# Patient Record
Sex: MALE | Race: WHITE | NOT HISPANIC OR LATINO | URBAN - METROPOLITAN AREA
[De-identification: names, ages, dates, MRNs, and addresses within clinical notes are randomized per-mention and may not be internally consistent; named-entity substitution may affect disease eponyms.]

---

## 2018-08-12 ENCOUNTER — INPATIENT (INPATIENT)
Facility: HOSPITAL | Age: 28
LOS: 9 days | Discharge: HOME | DRG: 853 | End: 2018-08-22
Attending: SURGERY | Admitting: SURGERY
Payer: MEDICAID

## 2018-08-12 VITALS
DIASTOLIC BLOOD PRESSURE: 45 MMHG | SYSTOLIC BLOOD PRESSURE: 89 MMHG | OXYGEN SATURATION: 96 % | HEART RATE: 113 BPM | RESPIRATION RATE: 18 BRPM | TEMPERATURE: 101 F | WEIGHT: 139.99 LBS

## 2018-08-12 LAB
ALBUMIN SERPL ELPH-MCNC: 2.2 G/DL — LOW (ref 3.4–5)
ALP SERPL-CCNC: 88 U/L — SIGNIFICANT CHANGE UP (ref 40–120)
ALT FLD-CCNC: 45 U/L — HIGH (ref 12–42)
ANION GAP SERPL CALC-SCNC: 6 MMOL/L — LOW (ref 9–16)
AST SERPL-CCNC: 50 U/L — HIGH (ref 15–37)
BASOPHILS NFR BLD AUTO: 0.5 % — SIGNIFICANT CHANGE UP (ref 0–2)
BILIRUB SERPL-MCNC: 0.4 MG/DL — SIGNIFICANT CHANGE UP (ref 0.2–1.2)
BUN SERPL-MCNC: 21 MG/DL — SIGNIFICANT CHANGE UP (ref 7–23)
CALCIUM SERPL-MCNC: 8.7 MG/DL — SIGNIFICANT CHANGE UP (ref 8.5–10.5)
CHLORIDE SERPL-SCNC: 96 MMOL/L — SIGNIFICANT CHANGE UP (ref 96–108)
CK SERPL-CCNC: 730 U/L — HIGH (ref 39–308)
CO2 SERPL-SCNC: 29 MMOL/L — SIGNIFICANT CHANGE UP (ref 22–31)
CREAT SERPL-MCNC: 1.23 MG/DL — SIGNIFICANT CHANGE UP (ref 0.5–1.3)
EOSINOPHIL NFR BLD AUTO: 0 % — SIGNIFICANT CHANGE UP (ref 0–6)
GLUCOSE SERPL-MCNC: 134 MG/DL — HIGH (ref 70–99)
HCT VFR BLD CALC: 34.1 % — LOW (ref 39–50)
HGB BLD-MCNC: 11.5 G/DL — LOW (ref 13–17)
IMM GRANULOCYTES NFR BLD AUTO: 2.4 % — HIGH (ref 0–1.5)
LACTATE SERPL-SCNC: 3.6 MMOL/L — HIGH (ref 0.4–2)
LYMPHOCYTES # BLD AUTO: 3.7 % — LOW (ref 13–44)
MAGNESIUM SERPL-MCNC: 2.3 MG/DL — SIGNIFICANT CHANGE UP (ref 1.6–2.6)
MCHC RBC-ENTMCNC: 31.3 PG — SIGNIFICANT CHANGE UP (ref 27–34)
MCHC RBC-ENTMCNC: 33.7 G/DL — SIGNIFICANT CHANGE UP (ref 32–36)
MCV RBC AUTO: 92.9 FL — SIGNIFICANT CHANGE UP (ref 80–100)
MONOCYTES NFR BLD AUTO: 6.4 % — SIGNIFICANT CHANGE UP (ref 2–14)
NEUTROPHILS NFR BLD AUTO: 87 % — HIGH (ref 43–77)
PHOSPHATE SERPL-MCNC: 2.5 MG/DL — SIGNIFICANT CHANGE UP (ref 2.5–4.5)
PLATELET # BLD AUTO: 269 K/UL — SIGNIFICANT CHANGE UP (ref 150–400)
POTASSIUM SERPL-MCNC: 4.4 MMOL/L — SIGNIFICANT CHANGE UP (ref 3.5–5.3)
POTASSIUM SERPL-SCNC: 4.4 MMOL/L — SIGNIFICANT CHANGE UP (ref 3.5–5.3)
PROT SERPL-MCNC: 7.2 G/DL — SIGNIFICANT CHANGE UP (ref 6.4–8.2)
RBC # BLD: 3.67 M/UL — LOW (ref 4.2–5.8)
RBC # FLD: 13.2 % — SIGNIFICANT CHANGE UP (ref 10.3–16.9)
SODIUM SERPL-SCNC: 131 MMOL/L — LOW (ref 132–145)
WBC # BLD: 23.9 K/UL — HIGH (ref 3.8–10.5)
WBC # FLD AUTO: 23.9 K/UL — HIGH (ref 3.8–10.5)

## 2018-08-12 PROCEDURE — 99291 CRITICAL CARE FIRST HOUR: CPT

## 2018-08-12 PROCEDURE — 73701 CT LOWER EXTREMITY W/DYE: CPT | Mod: 26,RT

## 2018-08-12 RX ORDER — SODIUM CHLORIDE 9 MG/ML
2000 INJECTION INTRAMUSCULAR; INTRAVENOUS; SUBCUTANEOUS ONCE
Qty: 0 | Refills: 0 | Status: COMPLETED | OUTPATIENT
Start: 2018-08-12 | End: 2018-08-12

## 2018-08-12 RX ORDER — PERMETHRIN CREAM 5% W/W 50 MG/G
1 CREAM TOPICAL ONCE
Qty: 0 | Refills: 0 | Status: COMPLETED | OUTPATIENT
Start: 2018-08-12 | End: 2018-08-12

## 2018-08-12 RX ORDER — AZTREONAM 2 G
1000 VIAL (EA) INJECTION ONCE
Qty: 0 | Refills: 0 | Status: COMPLETED | OUTPATIENT
Start: 2018-08-12 | End: 2018-08-12

## 2018-08-12 RX ORDER — VANCOMYCIN HCL 1 G
1000 VIAL (EA) INTRAVENOUS ONCE
Qty: 0 | Refills: 0 | Status: COMPLETED | OUTPATIENT
Start: 2018-08-12 | End: 2018-08-12

## 2018-08-12 RX ADMIN — PERMETHRIN CREAM 5% W/W 1 APPLICATION(S): 50 CREAM TOPICAL at 23:00

## 2018-08-12 RX ADMIN — SODIUM CHLORIDE 1000 MILLILITER(S): 9 INJECTION INTRAMUSCULAR; INTRAVENOUS; SUBCUTANEOUS at 20:04

## 2018-08-12 RX ADMIN — Medication 1000 MILLIGRAM(S): at 23:00

## 2018-08-12 RX ADMIN — SODIUM CHLORIDE 2000 MILLILITER(S): 9 INJECTION INTRAMUSCULAR; INTRAVENOUS; SUBCUTANEOUS at 23:00

## 2018-08-12 RX ADMIN — Medication 50 MILLIGRAM(S): at 20:04

## 2018-08-12 RX ADMIN — Medication 250 MILLIGRAM(S): at 20:10

## 2018-08-12 NOTE — ED PROVIDER NOTE - MEDICAL DECISION MAKING DETAILS
sepsis vitals, ivdu, likely cellulitis based on clinical exam, will check labs, abx, CT eval for abscess, no lyudmila's on exam,

## 2018-08-12 NOTE — ED PROVIDER NOTE - PHYSICAL EXAMINATION
CON: ao x 3, poor hygiene, HENMT: clear oropharynx, soft neck, HEAD: atraumatic, CV: tachycardic, RESP: cta b/l, GI: +BS, soft, nontender, no rebound, no guarding, SKIN: old needle tracks noted, erythema noted to right anteromedial upper leg, no fluctuance, tender, warm, no bullae, no scrotal involvement, MSK: soft compartment, NEURO: no gross motor or sensory deficit CON: ao x 3, poor hygiene, HENMT: clear oropharynx, soft neck, HEAD: atraumatic, CV: tachycardic, RESP: cta b/l, GI: +BS, soft, nontender, no rebound, no guarding, SKIN: old needle tracks noted to extremities, erythema noted to right anteromedial upper leg, no fluctuance, tender, warm, no bullae, no scrotal involvement, MSK: soft compartment, tender, distal pulses intact, no crepitus noted on exam, pain w/ ROM, NEURO: dec ROM of RLE 2/2 pain,

## 2018-08-12 NOTE — ED ADULT NURSE REASSESSMENT NOTE - NS ED NURSE REASSESS COMMENT FT1
Patient remains in RM 6, no acute distress. Remains on continuous cardiac monitoring. bp improving with IVFs. Patient awake alert and following commands.

## 2018-08-12 NOTE — ED PROVIDER NOTE - PROGRESS NOTE DETAILS
verbal readback from rads, abscess w/ possible nec fasc, no fb, vasc surg consulted immediately via transfer center verbal readback from rads, abscess w/ possible nec fasc, no fb, vasc surg consulted immediately via transfer center    per central transfer center, [facility] center call is not immediately available, on line for other emergency, will call back, understands tier 1 nature of transfer. called [facility] transfer center again, aware of case, still in process of another transfer  called again, spoke w/ vascular team, accepted transfer to SICU pt mentating, bp systolic >95, MAP > 65, 3rd L NS bolus running, will rpt vs, can start 4th bolus w/ LR and consider peripheral levo if bp worsening

## 2018-08-12 NOTE — ED ADULT NURSE NOTE - OBJECTIVE STATEMENT
Patient is an IVDU Patient is an IVDU presents to ED for infection to arms and legs, patient hypotensive and tachycardiac on assessment. Right leg pain with swelling and redness. Patient also has bug infestion in hair.  Sepsis and decontamination code initiated

## 2018-08-12 NOTE — ED ADULT NURSE NOTE - NSIMPLEMENTINTERV_GEN_ALL_ED
Implemented All Fall with Harm Risk Interventions:  Ava to call system. Call bell, personal items and telephone within reach. Instruct patient to call for assistance. Room bathroom lighting operational. Non-slip footwear when patient is off stretcher. Physically safe environment: no spills, clutter or unnecessary equipment. Stretcher in lowest position, wheels locked, appropriate side rails in place. Provide visual cue, wrist band, yellow gown, etc. Monitor gait and stability. Monitor for mental status changes and reorient to person, place, and time. Review medications for side effects contributing to fall risk. Reinforce activity limits and safety measures with patient and family. Provide visual clues: red socks.

## 2018-08-12 NOTE — ED PROVIDER NOTE - OBJECTIVE STATEMENT
28 yom pw fever, noted to be hypotensive, IVDU, right thigh which is a site of injection has pain and swelling, 28 yom pw fever, noted to be hypotensive, IVDU, right thigh which is a site of injection has pain and swelling. 28 yom pw fever, noted to be hypotensive, IVDU, right thigh which is a site of injection has pain and swelling.    unknown allergies to pcn

## 2018-08-12 NOTE — ED ADULT TRIAGE NOTE - CHIEF COMPLAINT QUOTE
here for "infection to arms and inner thigh" admits to IVDU- Pt is hypotensive, tachycardic and febrile in triage- sepsis initiated

## 2018-08-13 LAB
ALBUMIN SERPL ELPH-MCNC: 1.9 G/DL — LOW (ref 3.3–5)
ALP SERPL-CCNC: 73 U/L — SIGNIFICANT CHANGE UP (ref 40–120)
ALT FLD-CCNC: 33 U/L — SIGNIFICANT CHANGE UP (ref 10–45)
ANION GAP SERPL CALC-SCNC: 12 MMOL/L — SIGNIFICANT CHANGE UP (ref 5–17)
ANION GAP SERPL CALC-SCNC: 13 MMOL/L — SIGNIFICANT CHANGE UP (ref 5–17)
APTT BLD: 49.7 SEC — HIGH (ref 27.5–37.4)
AST SERPL-CCNC: 42 U/L — HIGH (ref 10–40)
BASE EXCESS BLDA CALC-SCNC: -1.6 MMOL/L — SIGNIFICANT CHANGE UP (ref -2–3)
BASE EXCESS BLDA CALC-SCNC: -2.3 MMOL/L — LOW (ref -2–3)
BILIRUB DIRECT SERPL-MCNC: <0.2 MG/DL — SIGNIFICANT CHANGE UP (ref 0–0.2)
BILIRUB INDIRECT FLD-MCNC: >0.1 MG/DL — LOW (ref 0.2–1)
BILIRUB SERPL-MCNC: 0.3 MG/DL — SIGNIFICANT CHANGE UP (ref 0.2–1.2)
BUN SERPL-MCNC: 11 MG/DL — SIGNIFICANT CHANGE UP (ref 7–23)
BUN SERPL-MCNC: 14 MG/DL — SIGNIFICANT CHANGE UP (ref 7–23)
CA-I BLDA-SCNC: 0.97 MMOL/L — LOW (ref 1.12–1.3)
CALCIUM SERPL-MCNC: 7 MG/DL — LOW (ref 8.4–10.5)
CALCIUM SERPL-MCNC: 7.3 MG/DL — LOW (ref 8.4–10.5)
CHLORIDE SERPL-SCNC: 102 MMOL/L — SIGNIFICANT CHANGE UP (ref 96–108)
CHLORIDE SERPL-SCNC: 103 MMOL/L — SIGNIFICANT CHANGE UP (ref 96–108)
CK MB CFR SERPL CALC: 4.2 NG/ML — SIGNIFICANT CHANGE UP (ref 0–6.7)
CK SERPL-CCNC: 603 U/L — HIGH (ref 30–200)
CO2 SERPL-SCNC: 21 MMOL/L — LOW (ref 22–31)
CO2 SERPL-SCNC: 23 MMOL/L — SIGNIFICANT CHANGE UP (ref 22–31)
COHGB MFR BLDA: 0.4 % — SIGNIFICANT CHANGE UP
CREAT SERPL-MCNC: 0.62 MG/DL — SIGNIFICANT CHANGE UP (ref 0.5–1.3)
CREAT SERPL-MCNC: 0.65 MG/DL — SIGNIFICANT CHANGE UP (ref 0.5–1.3)
EXTRA RED TOP TUBE: SIGNIFICANT CHANGE UP
GAS PNL BLDA: SIGNIFICANT CHANGE UP
GAS PNL BLDA: SIGNIFICANT CHANGE UP
GLUCOSE BLDC GLUCOMTR-MCNC: 109 MG/DL — HIGH (ref 70–99)
GLUCOSE BLDC GLUCOMTR-MCNC: 120 MG/DL — HIGH (ref 70–99)
GLUCOSE BLDC GLUCOMTR-MCNC: 125 MG/DL — HIGH (ref 70–99)
GLUCOSE BLDC GLUCOMTR-MCNC: 140 MG/DL — HIGH (ref 70–99)
GLUCOSE SERPL-MCNC: 116 MG/DL — HIGH (ref 70–99)
GLUCOSE SERPL-MCNC: 139 MG/DL — HIGH (ref 70–99)
GRAM STN FLD: SIGNIFICANT CHANGE UP
HAV IGM SER-ACNC: SIGNIFICANT CHANGE UP
HBV CORE IGM SER-ACNC: SIGNIFICANT CHANGE UP
HBV SURFACE AG SER-ACNC: SIGNIFICANT CHANGE UP
HCO3 BLDA-SCNC: 22 MMOL/L — SIGNIFICANT CHANGE UP (ref 21–28)
HCO3 BLDA-SCNC: 23 MMOL/L — SIGNIFICANT CHANGE UP (ref 21–28)
HCT VFR BLD CALC: 30.7 % — LOW (ref 39–50)
HCT VFR BLD CALC: 31.3 % — LOW (ref 39–50)
HCT VFR BLD CALC: 32.1 % — LOW (ref 39–50)
HCV AB S/CO SERPL IA: 17.68 S/CO — SIGNIFICANT CHANGE UP
HCV AB SERPL-IMP: REACTIVE
HGB BLD-MCNC: 10 G/DL — LOW (ref 13–17)
HGB BLD-MCNC: 10.3 G/DL — LOW (ref 13–17)
HGB BLD-MCNC: 10.6 G/DL — LOW (ref 13–17)
HGB BLDA-MCNC: 10.3 G/DL — LOW (ref 13–17)
HIV 1+2 AB+HIV1 P24 AG SERPL QL IA: SIGNIFICANT CHANGE UP
INR BLD: 1.2 — HIGH (ref 0.88–1.16)
LACTATE SERPL-SCNC: 1.7 MMOL/L — SIGNIFICANT CHANGE UP (ref 0.5–2)
LACTATE SERPL-SCNC: 2 MMOL/L — SIGNIFICANT CHANGE UP (ref 0.4–2)
LIDOCAIN IGE QN: 17 U/L — SIGNIFICANT CHANGE UP (ref 7–60)
MAGNESIUM SERPL-MCNC: 1.7 MG/DL — SIGNIFICANT CHANGE UP (ref 1.6–2.6)
MAGNESIUM SERPL-MCNC: 1.7 MG/DL — SIGNIFICANT CHANGE UP (ref 1.6–2.6)
MCHC RBC-ENTMCNC: 30.6 PG — SIGNIFICANT CHANGE UP (ref 27–34)
MCHC RBC-ENTMCNC: 30.7 PG — SIGNIFICANT CHANGE UP (ref 27–34)
MCHC RBC-ENTMCNC: 30.8 PG — SIGNIFICANT CHANGE UP (ref 27–34)
MCHC RBC-ENTMCNC: 32.6 G/DL — SIGNIFICANT CHANGE UP (ref 32–36)
MCHC RBC-ENTMCNC: 32.9 G/DL — SIGNIFICANT CHANGE UP (ref 32–36)
MCHC RBC-ENTMCNC: 33 G/DL — SIGNIFICANT CHANGE UP (ref 32–36)
MCV RBC AUTO: 93 FL — SIGNIFICANT CHANGE UP (ref 80–100)
MCV RBC AUTO: 93.7 FL — SIGNIFICANT CHANGE UP (ref 80–100)
MCV RBC AUTO: 93.9 FL — SIGNIFICANT CHANGE UP (ref 80–100)
METHGB MFR BLDA: 0.3 % — SIGNIFICANT CHANGE UP
O2 CT VFR BLDA CALC: SIGNIFICANT CHANGE UP (ref 15–23)
OXYHGB MFR BLDA: 96 % — SIGNIFICANT CHANGE UP (ref 94–100)
PCO2 BLDA: 34 MMHG — LOW (ref 35–48)
PCO2 BLDA: 41 MMHG — SIGNIFICANT CHANGE UP (ref 35–48)
PCP SPEC-MCNC: SIGNIFICANT CHANGE UP
PH BLDA: 7.38 — SIGNIFICANT CHANGE UP (ref 7.35–7.45)
PH BLDA: 7.42 — SIGNIFICANT CHANGE UP (ref 7.35–7.45)
PHOSPHATE SERPL-MCNC: 2.2 MG/DL — LOW (ref 2.5–4.5)
PHOSPHATE SERPL-MCNC: 3.1 MG/DL — SIGNIFICANT CHANGE UP (ref 2.5–4.5)
PLATELET # BLD AUTO: 225 K/UL — SIGNIFICANT CHANGE UP (ref 150–400)
PLATELET # BLD AUTO: 236 K/UL — SIGNIFICANT CHANGE UP (ref 150–400)
PLATELET # BLD AUTO: 312 K/UL — SIGNIFICANT CHANGE UP (ref 150–400)
PO2 BLDA: 138 MMHG — HIGH (ref 83–108)
PO2 BLDA: 92 MMHG — SIGNIFICANT CHANGE UP (ref 83–108)
POTASSIUM BLDA-SCNC: 3.4 MMOL/L — LOW (ref 3.5–4.9)
POTASSIUM SERPL-MCNC: 3.6 MMOL/L — SIGNIFICANT CHANGE UP (ref 3.5–5.3)
POTASSIUM SERPL-MCNC: 3.9 MMOL/L — SIGNIFICANT CHANGE UP (ref 3.5–5.3)
POTASSIUM SERPL-SCNC: 3.6 MMOL/L — SIGNIFICANT CHANGE UP (ref 3.5–5.3)
POTASSIUM SERPL-SCNC: 3.9 MMOL/L — SIGNIFICANT CHANGE UP (ref 3.5–5.3)
PROT SERPL-MCNC: 4.7 G/DL — LOW (ref 6–8.3)
PROTHROM AB SERPL-ACNC: 13.4 SEC — HIGH (ref 9.8–12.7)
RBC # BLD: 3.27 M/UL — LOW (ref 4.2–5.8)
RBC # BLD: 3.34 M/UL — LOW (ref 4.2–5.8)
RBC # BLD: 3.45 M/UL — LOW (ref 4.2–5.8)
RBC # FLD: 13.5 % — SIGNIFICANT CHANGE UP (ref 10.3–16.9)
RBC # FLD: 13.6 % — SIGNIFICANT CHANGE UP (ref 10.3–16.9)
RBC # FLD: 13.6 % — SIGNIFICANT CHANGE UP (ref 10.3–16.9)
SAO2 % BLDA: 97 % — SIGNIFICANT CHANGE UP (ref 95–100)
SAO2 % BLDA: 99 % — SIGNIFICANT CHANGE UP (ref 95–100)
SODIUM BLDA-SCNC: 133 MMOL/L — LOW (ref 138–146)
SODIUM SERPL-SCNC: 137 MMOL/L — SIGNIFICANT CHANGE UP (ref 135–145)
SODIUM SERPL-SCNC: 137 MMOL/L — SIGNIFICANT CHANGE UP (ref 135–145)
SPECIMEN SOURCE: SIGNIFICANT CHANGE UP
TROPONIN T SERPL-MCNC: <0.01 NG/ML — SIGNIFICANT CHANGE UP (ref 0–0.01)
WBC # BLD: 22.1 K/UL — HIGH (ref 3.8–10.5)
WBC # BLD: 24.6 K/UL — HIGH (ref 3.8–10.5)
WBC # BLD: 25.4 K/UL — HIGH (ref 3.8–10.5)
WBC # FLD AUTO: 22.1 K/UL — HIGH (ref 3.8–10.5)
WBC # FLD AUTO: 24.6 K/UL — HIGH (ref 3.8–10.5)
WBC # FLD AUTO: 25.4 K/UL — HIGH (ref 3.8–10.5)

## 2018-08-13 PROCEDURE — 11043 DBRDMT MUSC&/FSCA 1ST 20/<: CPT | Mod: GC

## 2018-08-13 PROCEDURE — 71045 X-RAY EXAM CHEST 1 VIEW: CPT | Mod: 26,76

## 2018-08-13 PROCEDURE — 36556 INSERT NON-TUNNEL CV CATH: CPT

## 2018-08-13 PROCEDURE — 74018 RADEX ABDOMEN 1 VIEW: CPT | Mod: 26

## 2018-08-13 PROCEDURE — 11046 DBRDMT MUSC&/FSCA EA ADDL: CPT | Mod: GC

## 2018-08-13 PROCEDURE — 99232 SBSQ HOSP IP/OBS MODERATE 35: CPT | Mod: GC

## 2018-08-13 RX ORDER — MIDAZOLAM HYDROCHLORIDE 1 MG/ML
0.02 INJECTION, SOLUTION INTRAMUSCULAR; INTRAVENOUS
Qty: 100 | Refills: 0 | Status: DISCONTINUED | OUTPATIENT
Start: 2018-08-13 | End: 2018-08-15

## 2018-08-13 RX ORDER — INSULIN LISPRO 100/ML
VIAL (ML) SUBCUTANEOUS EVERY 6 HOURS
Qty: 0 | Refills: 0 | Status: DISCONTINUED | OUTPATIENT
Start: 2018-08-13 | End: 2018-08-19

## 2018-08-13 RX ORDER — FENTANYL CITRATE 50 UG/ML
0.5 INJECTION INTRAVENOUS
Qty: 2500 | Refills: 0 | Status: DISCONTINUED | OUTPATIENT
Start: 2018-08-13 | End: 2018-08-15

## 2018-08-13 RX ORDER — SODIUM CHLORIDE 9 MG/ML
1000 INJECTION INTRAMUSCULAR; INTRAVENOUS; SUBCUTANEOUS ONCE
Qty: 0 | Refills: 0 | Status: COMPLETED | OUTPATIENT
Start: 2018-08-13 | End: 2018-08-13

## 2018-08-13 RX ORDER — SODIUM CHLORIDE 9 MG/ML
1000 INJECTION, SOLUTION INTRAVENOUS
Qty: 0 | Refills: 0 | Status: DISCONTINUED | OUTPATIENT
Start: 2018-08-13 | End: 2018-08-14

## 2018-08-13 RX ORDER — POTASSIUM PHOSPHATE, MONOBASIC POTASSIUM PHOSPHATE, DIBASIC 236; 224 MG/ML; MG/ML
21 INJECTION, SOLUTION INTRAVENOUS ONCE
Qty: 0 | Refills: 0 | Status: COMPLETED | OUTPATIENT
Start: 2018-08-13 | End: 2018-08-13

## 2018-08-13 RX ORDER — AZTREONAM 2 G
1000 VIAL (EA) INJECTION
Qty: 0 | Refills: 0 | Status: DISCONTINUED | OUTPATIENT
Start: 2018-08-13 | End: 2018-08-13

## 2018-08-13 RX ORDER — PERMETHRIN CREAM 5% W/W 50 MG/G
1 CREAM TOPICAL ONCE
Qty: 0 | Refills: 0 | Status: COMPLETED | OUTPATIENT
Start: 2018-08-13 | End: 2018-08-13

## 2018-08-13 RX ORDER — SODIUM CHLORIDE 9 MG/ML
1000 INJECTION INTRAMUSCULAR; INTRAVENOUS; SUBCUTANEOUS
Qty: 0 | Refills: 0 | Status: DISCONTINUED | OUTPATIENT
Start: 2018-08-13 | End: 2018-08-16

## 2018-08-13 RX ORDER — PROPOFOL 10 MG/ML
10 INJECTION, EMULSION INTRAVENOUS
Qty: 1000 | Refills: 0 | Status: DISCONTINUED | OUTPATIENT
Start: 2018-08-13 | End: 2018-08-13

## 2018-08-13 RX ORDER — ACETAMINOPHEN 500 MG
1000 TABLET ORAL ONCE
Qty: 0 | Refills: 0 | Status: COMPLETED | OUTPATIENT
Start: 2018-08-13 | End: 2018-08-13

## 2018-08-13 RX ORDER — HEPARIN SODIUM 5000 [USP'U]/ML
5000 INJECTION INTRAVENOUS; SUBCUTANEOUS EVERY 8 HOURS
Qty: 0 | Refills: 0 | Status: DISCONTINUED | OUTPATIENT
Start: 2018-08-13 | End: 2018-08-22

## 2018-08-13 RX ORDER — CHLORHEXIDINE GLUCONATE 213 G/1000ML
1 SOLUTION TOPICAL DAILY
Qty: 0 | Refills: 0 | Status: DISCONTINUED | OUTPATIENT
Start: 2018-08-13 | End: 2018-08-22

## 2018-08-13 RX ORDER — CHLORHEXIDINE GLUCONATE 213 G/1000ML
15 SOLUTION TOPICAL
Qty: 0 | Refills: 0 | Status: DISCONTINUED | OUTPATIENT
Start: 2018-08-13 | End: 2018-08-17

## 2018-08-13 RX ORDER — SODIUM CHLORIDE 9 MG/ML
1000 INJECTION INTRAMUSCULAR; INTRAVENOUS; SUBCUTANEOUS
Qty: 0 | Refills: 0 | Status: DISCONTINUED | OUTPATIENT
Start: 2018-08-13 | End: 2018-08-13

## 2018-08-13 RX ORDER — AZTREONAM 2 G
2000 VIAL (EA) INJECTION
Qty: 0 | Refills: 0 | Status: DISCONTINUED | OUTPATIENT
Start: 2018-08-13 | End: 2018-08-13

## 2018-08-13 RX ORDER — NOREPINEPHRINE BITARTRATE/D5W 8 MG/250ML
0.05 PLASTIC BAG, INJECTION (ML) INTRAVENOUS
Qty: 8 | Refills: 0 | Status: DISCONTINUED | OUTPATIENT
Start: 2018-08-13 | End: 2018-08-15

## 2018-08-13 RX ORDER — SODIUM CHLORIDE 9 MG/ML
1000 INJECTION, SOLUTION INTRAVENOUS
Qty: 0 | Refills: 0 | Status: DISCONTINUED | OUTPATIENT
Start: 2018-08-13 | End: 2018-08-13

## 2018-08-13 RX ORDER — VANCOMYCIN HCL 1 G
1000 VIAL (EA) INTRAVENOUS EVERY 12 HOURS
Qty: 0 | Refills: 0 | Status: DISCONTINUED | OUTPATIENT
Start: 2018-08-13 | End: 2018-08-16

## 2018-08-13 RX ORDER — PANTOPRAZOLE SODIUM 20 MG/1
40 TABLET, DELAYED RELEASE ORAL DAILY
Qty: 0 | Refills: 0 | Status: DISCONTINUED | OUTPATIENT
Start: 2018-08-13 | End: 2018-08-22

## 2018-08-13 RX ORDER — AZTREONAM 2 G
2000 VIAL (EA) INJECTION
Qty: 0 | Refills: 0 | Status: DISCONTINUED | OUTPATIENT
Start: 2018-08-13 | End: 2018-08-17

## 2018-08-13 RX ORDER — POTASSIUM CHLORIDE 20 MEQ
20 PACKET (EA) ORAL ONCE
Qty: 0 | Refills: 0 | Status: COMPLETED | OUTPATIENT
Start: 2018-08-13 | End: 2018-08-13

## 2018-08-13 RX ORDER — MAGNESIUM SULFATE 500 MG/ML
2 VIAL (ML) INJECTION ONCE
Qty: 0 | Refills: 0 | Status: COMPLETED | OUTPATIENT
Start: 2018-08-13 | End: 2018-08-13

## 2018-08-13 RX ORDER — PROPOFOL 10 MG/ML
7.87 INJECTION, EMULSION INTRAVENOUS
Qty: 1000 | Refills: 0 | Status: DISCONTINUED | OUTPATIENT
Start: 2018-08-13 | End: 2018-08-14

## 2018-08-13 RX ADMIN — SODIUM CHLORIDE 100 MILLILITER(S): 9 INJECTION, SOLUTION INTRAVENOUS at 19:48

## 2018-08-13 RX ADMIN — PERMETHRIN CREAM 5% W/W 1 APPLICATION(S): 50 CREAM TOPICAL at 12:15

## 2018-08-13 RX ADMIN — Medication 100 MILLIGRAM(S): at 11:08

## 2018-08-13 RX ADMIN — PANTOPRAZOLE SODIUM 40 MILLIGRAM(S): 20 TABLET, DELAYED RELEASE ORAL at 12:00

## 2018-08-13 RX ADMIN — Medication 100 MILLIGRAM(S): at 19:36

## 2018-08-13 RX ADMIN — Medication 100 MILLIGRAM(S): at 11:07

## 2018-08-13 RX ADMIN — Medication 250 MILLIGRAM(S): at 11:07

## 2018-08-13 RX ADMIN — Medication 50 MILLIEQUIVALENT(S): at 19:36

## 2018-08-13 RX ADMIN — Medication 400 MILLIGRAM(S): at 23:35

## 2018-08-13 RX ADMIN — POTASSIUM PHOSPHATE, MONOBASIC POTASSIUM PHOSPHATE, DIBASIC 64.25 MILLIMOLE(S): 236; 224 INJECTION, SOLUTION INTRAVENOUS at 06:21

## 2018-08-13 RX ADMIN — Medication 100 MILLIGRAM(S): at 16:26

## 2018-08-13 RX ADMIN — FENTANYL CITRATE 3.17 MICROGRAM(S)/KG/HR: 50 INJECTION INTRAVENOUS at 13:30

## 2018-08-13 RX ADMIN — CHLORHEXIDINE GLUCONATE 15 MILLILITER(S): 213 SOLUTION TOPICAL at 06:20

## 2018-08-13 RX ADMIN — PERMETHRIN CREAM 5% W/W 1 APPLICATION(S): 50 CREAM TOPICAL at 12:30

## 2018-08-13 RX ADMIN — HEPARIN SODIUM 5000 UNIT(S): 5000 INJECTION INTRAVENOUS; SUBCUTANEOUS at 06:20

## 2018-08-13 RX ADMIN — MIDAZOLAM HYDROCHLORIDE 1.27 MG/KG/HR: 1 INJECTION, SOLUTION INTRAMUSCULAR; INTRAVENOUS at 16:28

## 2018-08-13 RX ADMIN — PROPOFOL 3 MICROGRAM(S)/KG/MIN: 10 INJECTION, EMULSION INTRAVENOUS at 23:23

## 2018-08-13 RX ADMIN — HEPARIN SODIUM 5000 UNIT(S): 5000 INJECTION INTRAVENOUS; SUBCUTANEOUS at 16:27

## 2018-08-13 RX ADMIN — Medication 50 GRAM(S): at 06:20

## 2018-08-13 RX ADMIN — Medication 5.95 MICROGRAM(S)/KG/MIN: at 06:29

## 2018-08-13 RX ADMIN — HEPARIN SODIUM 5000 UNIT(S): 5000 INJECTION INTRAVENOUS; SUBCUTANEOUS at 23:24

## 2018-08-13 RX ADMIN — CHLORHEXIDINE GLUCONATE 15 MILLILITER(S): 213 SOLUTION TOPICAL at 19:47

## 2018-08-13 RX ADMIN — SODIUM CHLORIDE 4000 MILLILITER(S): 9 INJECTION INTRAMUSCULAR; INTRAVENOUS; SUBCUTANEOUS at 14:47

## 2018-08-13 RX ADMIN — PERMETHRIN CREAM 5% W/W 1 APPLICATION(S): 50 CREAM TOPICAL at 22:16

## 2018-08-13 RX ADMIN — Medication 100 MILLIGRAM(S): at 04:13

## 2018-08-13 NOTE — PROGRESS NOTE ADULT - ASSESSMENT
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    Neuro: keep sedated for now with propofol gtt/fentanyl gtt  CV: levophed gtt, normotensive goals MAP > 65  Pulm: Intubated /40/12/5  GI/FEN: NPO  : Higuera  ID: Aztreonam (8/13-), Vanco (8/13-), Clinda (8/13-)  Endo: ISS  Heme: HSQ  PPX: SCDs  Lines: PIV, Joana  Wounds: RLE  PT/OT: not ordered 29 yo M h/o IVDU (meth, heroin) Etoh abuse, schizophrenia p/w RLE pain and erythema 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   GI/FEN: NPO, IVF  : Higuera  ID: right leg gangrene, has PCN alergy?? (unknown reaction) - currently on Aztreonam (8/13-), Vanco (8/13-), Clinda (8/13-).   Endo: ISS  Heme: HSQ  PPX: SCDs  Lines: Joana FOX (8/12--)   Wounds: RLE dsg   PT/OT: hold off for now pending further debridement with vascular surgery.

## 2018-08-13 NOTE — H&P ADULT - ASSESSMENT
29 y/o M with history of IVDU, paranoid schizophrenia, alcohol abuse with extensive right thigh necrotizing fasciitis     Emergent OR for right thigh extensive debridement possible amputation  IV antibiotics (Vancomycin/Aztreonam)

## 2018-08-13 NOTE — H&P ADULT - NSHPPHYSICALEXAM_GEN_ALL_CORE
General: Confused, A/O x 1, awake  Lungs: Unlabored breathing, no respiratory distress  Cardiac: Tachycardia, regular rate  Extremity: Right thigh swelling from knee to hip region, erythema noted to right anteromedial upper leg, no fluctuance, tender, and warm to palpation  Skin/hair: multiple lice in hair and some on skin General: Confused, A/O x 1, awake  Lungs: Unlabored breathing, no respiratory distress, CTA  Cardiac: RRR  Extremity: Right thigh swelling from knee to hip region, erythema noted to right anteromedial upper leg, no fluctuance, tender, and warm to palpation  Skin/hair: multiple lice in hair and some on skin

## 2018-08-13 NOTE — PROGRESS NOTE ADULT - SUBJECTIVE AND OBJECTIVE BOX
S: admitted overnight for right thigh gangrene s/p debridement.     O: ICU Vital Signs Last 24 Hrs  T(F): 97 (08-13-18 @ 06:01), Max: 101.2 (08-12-18 @ 19:37)  HR: 70 (08-13-18 @ 07:00) (70 - 114)  BP: 95/54 (08-12-18 @ 23:10) (89/45 - 96/52)  BP(mean): --  ABP: 100/48 (08-13-18 @ 07:00)  RR: 12 (08-13-18 @ 07:00) (10 - 22)  SpO2: 99% (08-13-18 @ 07:00) (94% - 100%)    PHYSICAL EXAM:   Neurological: sedated, but moves all extremeties when on lower sedatives.   HEENT: hair and body with lice/fleas infestation.   Cardiovascular: RRR  Respiratory: CTA  Gastrointestinal: soft, NT, ND, BS+  Extremities: warm, no dependent edema  Vascular: no cyanosis/erythema  SKIN: multiple areas of excoration in both arms and legs. right antecub area with small area of eschar.     LABS:    08-13    137  |  102  |  11  ----------------------------<  139<H>  3.6   |  23  |  0.62    Ca    7.3<L>      13 Aug 2018 03:52  Phos  2.2     08-13  Mg     1.7     08-13    TPro  4.7<L>  /  Alb  1.9<L>  /  TBili  0.3  /  DBili  <0.2  /  AST  42<H>  /  ALT  33  /  AlkPhos  73  08-13  LIVER FUNCTIONS - ( 13 Aug 2018 03:52 )  Alb: 1.9 g/dL / Pro: 4.7 g/dL / ALK PHOS: 73 U/L / ALT: 33 U/L / AST: 42 U/L / GGT: x                               10.6   24.6  )-----------( 225      ( 13 Aug 2018 03:52 )             32.1   PT/INR - ( 13 Aug 2018 01:47 )   PT: 13.4 sec;   INR: 1.20          PTT - ( 13 Aug 2018 01:47 )  PTT:49.7 secCARDIAC MARKERS ( 13 Aug 2018 01:49 )  x     / <0.01 ng/mL / 603 U/L / x     / 4.2 ng/mL  CARDIAC MARKERS ( 12 Aug 2018 20:01 )  x     / x     / 730 U/L / x     / x        ABG - ( 13 Aug 2018 03:56 )  pH, Arterial: 7.38  pH, Blood: x     /  pCO2: 41    /  pO2: 138   / HCO3: 23    / Base Excess: -1.6  /  SaO2: 99              CAPILLARY BLOOD GLUCOSE      POCT Blood Glucose.: 140 mg/dL (13 Aug 2018 06:35)  POCT Blood Glucose.: 109 mg/dL (13 Aug 2018 02:18)    MEDICATIONS  (STANDING):  aztreonam  IVPB 2000 milliGRAM(s) IV Intermittent <User Schedule>  aztreonam  IVPB 2000 milliGRAM(s) IV Intermittent <User Schedule>  chlorhexidine 0.12% Liquid 15 milliLiter(s) Swish and Spit two times a day  clindamycin IVPB      clindamycin IVPB 600 milliGRAM(s) IV Intermittent every 8 hours  fentaNYL   Infusion 0.5 MICROgram(s)/kG/Hr (3.175 mL/Hr) IV Continuous <Continuous>  heparin  Injectable 5000 Unit(s) SubCutaneous every 8 hours  insulin lispro (HumaLOG) corrective regimen sliding scale   SubCutaneous every 6 hours  norepinephrine Infusion 0.05 MICROgram(s)/kG/Min (5.953 mL/Hr) IV Continuous <Continuous>  pantoprazole  Injectable 40 milliGRAM(s) IV Push daily  permethrin 1% Rinse 1 Application(s) Topical once  potassium chloride  20 mEq/100 mL IVPB 20 milliEquivalent(s) IV Intermittent once  propofol Infusion 10 MICROgram(s)/kG/Min (3.81 mL/Hr) IV Continuous <Continuous>  sodium chloride 0.9%. 1000 milliLiter(s) (100 mL/Hr) IV Continuous <Continuous>    MEDICATIONS  (PRN):      Higuera:	  [ ] None	[x ] Daily Higuera Order Placed	   Indication:	  [x ] Strict I and O's    [ ] Obstruction     [ ] Incontinence + Stage 3 or 4 Decubitus  Central Line:  [x ] None	   [ ]  Medication / TPN Administration     [ ] No Peripheral IV

## 2018-08-13 NOTE — PROGRESS NOTE ADULT - SUBJECTIVE AND OBJECTIVE BOX
since arrival from [facility] patient has been confused and hypotensive requiring fluid boluses   patient confused  not certain he comprehends what is happening but needs emergent surgery as a life saving measure  as he has classic exam presentation of necrotizing extensive soft tissue infection of right leg  expect extensive debridement possible amputation

## 2018-08-13 NOTE — PROCEDURE NOTE - NSPROCDETAILS_GEN_ALL_CORE
sterile dressing applied/guidewire recovered/ultrasound guidance/sterile technique, catheter placed/lumen(s) aspirated and flushed

## 2018-08-13 NOTE — BRIEF OPERATIVE NOTE - PROCEDURE
<<-----Click on this checkbox to enter Procedure Vascular surgical procedure involving right lower extremity  08/13/2018  Extensive excisional debridement of right thigh with release of fascia selena  Active  LEANNA

## 2018-08-13 NOTE — H&P ADULT - HISTORY OF PRESENT ILLNESS
Pt is a 29 y/o M with history of IVDU, paranoid schizophrenia, alcohol abuse who presented to [facility] ED with fever c/o right thigh IVD injecting site pain and swelling.  At [facility] pt was hypotensive upon arrival requiring IVF. A CT scan of the thigh was performed which revealed finding consistent with extensive necrotizing infection of the right thigh. Pt was then transferred to [facility] for emergent surgical intervention.  Upon arrival to [facility] Pt is unable to provide any history. He continue to be hypotensive, requiring fluid boluses. C/o of right thigh pain, states its been present for 5 days. Unable to answer any more question.

## 2018-08-13 NOTE — CHART NOTE - NSCHARTNOTEFT_GEN_A_CORE
Central line pulled back 5cm based on xray showing it looped in the SVC. After being pulled back, all 3 ports had good blood withdraw and were easily flushed. Xray to confirm position ordered.

## 2018-08-13 NOTE — CHART NOTE - NSCHARTNOTEFT_GEN_A_CORE
Infectious Diseases Anti-infective Approval Note    Medication:  Aztreonam  Dose:  2 grams   Route:  IV  Frequency:  q8hrs  Duration:    3 days     Dose may be adjusted as needed for alterations in renal function.    *THIS IS NOT AN INFECTIOUS DISEASES CONSULTATION*

## 2018-08-13 NOTE — CONSULT NOTE ADULT - ATTENDING COMMENTS
ros negaitve other than positive elements noted I HPI  FH no pertinent positives  schizophrenia, IVDU sp injection into R thigh with subsequent necrotising sogft tissue infection, gas gangrene requiring emergency surgical debridement, plan for re exploration  cont mech vent with sedatino/analgesia iv drips  levophed as needed for hypotension  lactate normalized and uo adequate sugests adequate perfusion

## 2018-08-14 LAB
ANION GAP SERPL CALC-SCNC: 7 MMOL/L — SIGNIFICANT CHANGE UP (ref 5–17)
ANION GAP SERPL CALC-SCNC: 9 MMOL/L — SIGNIFICANT CHANGE UP (ref 5–17)
BASE EXCESS BLDA CALC-SCNC: 1.1 MMOL/L — SIGNIFICANT CHANGE UP (ref -2–3)
BUN SERPL-MCNC: 7 MG/DL — SIGNIFICANT CHANGE UP (ref 7–23)
BUN SERPL-MCNC: 8 MG/DL — SIGNIFICANT CHANGE UP (ref 7–23)
CALCIUM SERPL-MCNC: 7.4 MG/DL — LOW (ref 8.4–10.5)
CALCIUM SERPL-MCNC: 7.5 MG/DL — LOW (ref 8.4–10.5)
CHLORIDE SERPL-SCNC: 104 MMOL/L — SIGNIFICANT CHANGE UP (ref 96–108)
CHLORIDE SERPL-SCNC: 107 MMOL/L — SIGNIFICANT CHANGE UP (ref 96–108)
CO2 SERPL-SCNC: 24 MMOL/L — SIGNIFICANT CHANGE UP (ref 22–31)
CO2 SERPL-SCNC: 27 MMOL/L — SIGNIFICANT CHANGE UP (ref 22–31)
CREAT SERPL-MCNC: 0.7 MG/DL — SIGNIFICANT CHANGE UP (ref 0.5–1.3)
CREAT SERPL-MCNC: 0.71 MG/DL — SIGNIFICANT CHANGE UP (ref 0.5–1.3)
GAS PNL BLDA: SIGNIFICANT CHANGE UP
GLUCOSE BLDC GLUCOMTR-MCNC: 105 MG/DL — HIGH (ref 70–99)
GLUCOSE BLDC GLUCOMTR-MCNC: 108 MG/DL — HIGH (ref 70–99)
GLUCOSE BLDC GLUCOMTR-MCNC: 112 MG/DL — HIGH (ref 70–99)
GLUCOSE BLDC GLUCOMTR-MCNC: 99 MG/DL — SIGNIFICANT CHANGE UP (ref 70–99)
GLUCOSE SERPL-MCNC: 111 MG/DL — HIGH (ref 70–99)
GLUCOSE SERPL-MCNC: 112 MG/DL — HIGH (ref 70–99)
HCO3 BLDA-SCNC: 25 MMOL/L — SIGNIFICANT CHANGE UP (ref 21–28)
HCT VFR BLD CALC: 30.1 % — LOW (ref 39–50)
HCT VFR BLD CALC: 31.3 % — LOW (ref 39–50)
HGB BLD-MCNC: 10.1 G/DL — LOW (ref 13–17)
HGB BLD-MCNC: 9.7 G/DL — LOW (ref 13–17)
LACTATE SERPL-SCNC: 0.8 MMOL/L — SIGNIFICANT CHANGE UP (ref 0.5–2)
LACTATE SERPL-SCNC: 1 MMOL/L — SIGNIFICANT CHANGE UP (ref 0.5–2)
MAGNESIUM SERPL-MCNC: 1.9 MG/DL — SIGNIFICANT CHANGE UP (ref 1.6–2.6)
MAGNESIUM SERPL-MCNC: 2.1 MG/DL — SIGNIFICANT CHANGE UP (ref 1.6–2.6)
MCHC RBC-ENTMCNC: 30.5 PG — SIGNIFICANT CHANGE UP (ref 27–34)
MCHC RBC-ENTMCNC: 30.6 PG — SIGNIFICANT CHANGE UP (ref 27–34)
MCHC RBC-ENTMCNC: 32.2 G/DL — SIGNIFICANT CHANGE UP (ref 32–36)
MCHC RBC-ENTMCNC: 32.3 G/DL — SIGNIFICANT CHANGE UP (ref 32–36)
MCV RBC AUTO: 94.7 FL — SIGNIFICANT CHANGE UP (ref 80–100)
MCV RBC AUTO: 94.8 FL — SIGNIFICANT CHANGE UP (ref 80–100)
PCO2 BLDA: 35 MMHG — SIGNIFICANT CHANGE UP (ref 35–48)
PH BLDA: 7.46 — HIGH (ref 7.35–7.45)
PHOSPHATE SERPL-MCNC: 3.3 MG/DL — SIGNIFICANT CHANGE UP (ref 2.5–4.5)
PHOSPHATE SERPL-MCNC: 3.4 MG/DL — SIGNIFICANT CHANGE UP (ref 2.5–4.5)
PLATELET # BLD AUTO: 338 K/UL — SIGNIFICANT CHANGE UP (ref 150–400)
PLATELET # BLD AUTO: 367 K/UL — SIGNIFICANT CHANGE UP (ref 150–400)
PO2 BLDA: 155 MMHG — HIGH (ref 83–108)
POTASSIUM SERPL-MCNC: 3.9 MMOL/L — SIGNIFICANT CHANGE UP (ref 3.5–5.3)
POTASSIUM SERPL-MCNC: 3.9 MMOL/L — SIGNIFICANT CHANGE UP (ref 3.5–5.3)
POTASSIUM SERPL-SCNC: 3.9 MMOL/L — SIGNIFICANT CHANGE UP (ref 3.5–5.3)
POTASSIUM SERPL-SCNC: 3.9 MMOL/L — SIGNIFICANT CHANGE UP (ref 3.5–5.3)
RBC # BLD: 3.18 M/UL — LOW (ref 4.2–5.8)
RBC # BLD: 3.3 M/UL — LOW (ref 4.2–5.8)
RBC # FLD: 13.7 % — SIGNIFICANT CHANGE UP (ref 10.3–16.9)
RBC # FLD: 13.7 % — SIGNIFICANT CHANGE UP (ref 10.3–16.9)
SAO2 % BLDA: 99 % — SIGNIFICANT CHANGE UP (ref 95–100)
SODIUM SERPL-SCNC: 138 MMOL/L — SIGNIFICANT CHANGE UP (ref 135–145)
SODIUM SERPL-SCNC: 140 MMOL/L — SIGNIFICANT CHANGE UP (ref 135–145)
WBC # BLD: 20.9 K/UL — HIGH (ref 3.8–10.5)
WBC # BLD: 21.1 K/UL — HIGH (ref 3.8–10.5)
WBC # FLD AUTO: 20.9 K/UL — HIGH (ref 3.8–10.5)
WBC # FLD AUTO: 21.1 K/UL — HIGH (ref 3.8–10.5)

## 2018-08-14 PROCEDURE — 99291 CRITICAL CARE FIRST HOUR: CPT

## 2018-08-14 PROCEDURE — 93306 TTE W/DOPPLER COMPLETE: CPT | Mod: 26

## 2018-08-14 PROCEDURE — 71045 X-RAY EXAM CHEST 1 VIEW: CPT | Mod: 26

## 2018-08-14 PROCEDURE — 11046 DBRDMT MUSC&/FSCA EA ADDL: CPT | Mod: GC

## 2018-08-14 PROCEDURE — 11043 DBRDMT MUSC&/FSCA 1ST 20/<: CPT | Mod: GC

## 2018-08-14 RX ORDER — SODIUM CHLORIDE 9 MG/ML
1000 INJECTION INTRAMUSCULAR; INTRAVENOUS; SUBCUTANEOUS ONCE
Qty: 0 | Refills: 0 | Status: COMPLETED | OUTPATIENT
Start: 2018-08-14 | End: 2018-08-14

## 2018-08-14 RX ORDER — THIAMINE MONONITRATE (VIT B1) 100 MG
100 TABLET ORAL ONCE
Qty: 0 | Refills: 0 | Status: DISCONTINUED | OUTPATIENT
Start: 2018-08-14 | End: 2018-08-14

## 2018-08-14 RX ORDER — FOLIC ACID 0.8 MG
1 TABLET ORAL DAILY
Qty: 0 | Refills: 0 | Status: DISCONTINUED | OUTPATIENT
Start: 2018-08-14 | End: 2018-08-16

## 2018-08-14 RX ORDER — THIAMINE MONONITRATE (VIT B1) 100 MG
100 TABLET ORAL DAILY
Qty: 0 | Refills: 0 | Status: DISCONTINUED | OUTPATIENT
Start: 2018-08-14 | End: 2018-08-16

## 2018-08-14 RX ORDER — PROPOFOL 10 MG/ML
7.87 INJECTION, EMULSION INTRAVENOUS
Qty: 1000 | Refills: 0 | Status: DISCONTINUED | OUTPATIENT
Start: 2018-08-14 | End: 2018-08-15

## 2018-08-14 RX ADMIN — Medication 100 MILLIGRAM(S): at 10:00

## 2018-08-14 RX ADMIN — Medication 100 MILLIGRAM(S): at 11:00

## 2018-08-14 RX ADMIN — PANTOPRAZOLE SODIUM 40 MILLIGRAM(S): 20 TABLET, DELAYED RELEASE ORAL at 11:00

## 2018-08-14 RX ADMIN — CHLORHEXIDINE GLUCONATE 15 MILLILITER(S): 213 SOLUTION TOPICAL at 17:12

## 2018-08-14 RX ADMIN — MIDAZOLAM HYDROCHLORIDE 1.27 MG/KG/HR: 1 INJECTION, SOLUTION INTRAMUSCULAR; INTRAVENOUS at 11:17

## 2018-08-14 RX ADMIN — CHLORHEXIDINE GLUCONATE 15 MILLILITER(S): 213 SOLUTION TOPICAL at 06:00

## 2018-08-14 RX ADMIN — Medication 5.95 MICROGRAM(S)/KG/MIN: at 03:00

## 2018-08-14 RX ADMIN — CHLORHEXIDINE GLUCONATE 1 APPLICATION(S): 213 SOLUTION TOPICAL at 11:37

## 2018-08-14 RX ADMIN — Medication 100 MILLIGRAM(S): at 05:02

## 2018-08-14 RX ADMIN — Medication 100 MILLIGRAM(S): at 19:15

## 2018-08-14 RX ADMIN — SODIUM CHLORIDE 4000 MILLILITER(S): 9 INJECTION INTRAMUSCULAR; INTRAVENOUS; SUBCUTANEOUS at 00:30

## 2018-08-14 RX ADMIN — PROPOFOL 3 MICROGRAM(S)/KG/MIN: 10 INJECTION, EMULSION INTRAVENOUS at 06:00

## 2018-08-14 RX ADMIN — Medication 250 MILLIGRAM(S): at 22:18

## 2018-08-14 RX ADMIN — Medication 100 MILLIGRAM(S): at 03:40

## 2018-08-14 RX ADMIN — Medication 250 MILLIGRAM(S): at 01:45

## 2018-08-14 RX ADMIN — Medication 100 MILLIGRAM(S): at 18:00

## 2018-08-14 RX ADMIN — Medication 100 MILLIGRAM(S): at 20:00

## 2018-08-14 RX ADMIN — HEPARIN SODIUM 5000 UNIT(S): 5000 INJECTION INTRAVENOUS; SUBCUTANEOUS at 22:18

## 2018-08-14 RX ADMIN — SODIUM CHLORIDE 100 MILLILITER(S): 9 INJECTION INTRAMUSCULAR; INTRAVENOUS; SUBCUTANEOUS at 22:22

## 2018-08-14 RX ADMIN — Medication 250 MILLIGRAM(S): at 11:00

## 2018-08-14 RX ADMIN — Medication 1 MILLIGRAM(S): at 19:15

## 2018-08-14 NOTE — PROGRESS NOTE ADULT - ASSESSMENT
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     Neuro: versed gtt/fentanyl gtt  CV: levophed gtt,   Pulm: Intubated /40/12/5  GI/FEN: NPO, protonix, banana bag@100  : Kalen  ID: R leg gangrene: Aztreonam (8/13-), Vanco (8/13-), Clinda (8/13-)  Endo: ISS  Heme: HSQ  PPX: SCD  Lines: PIV, R TLC (8/13--)

## 2018-08-14 NOTE — DIETITIAN INITIAL EVALUATION ADULT. - ENERGY NEEDS
Ht 172.72cm; Wt 63.5Kg  IBW 70Kg; %IBW 91%  BMI 21.3    Utilized IBW to calculate needs 2/2 vent/post-op/pre-op/wound healing.

## 2018-08-14 NOTE — DIETITIAN INITIAL EVALUATION ADULT. - OTHER INFO
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 NPO ordered. Pt remains intubated and sedated. Propofol turned off. Fentanyl, versed, norepinephrine, and banana bag infusing. Continue banana bag; once d/c'd add MVI, thiamine, and folic acid 2/2 ETOH abuse. With continued intubated recommend early EN once medically feasible; EN recs left below if pt appropriate after the OR. Will follow.

## 2018-08-14 NOTE — DIETITIAN INITIAL EVALUATION ADULT. - NS AS NUTRI INTERV ENTERAL NUTRITION
With continued intubation, recommend early EN initiation with route per MD as medically feasible after the OR. Jevity 1.5 with goal rate of 55ml/hr x 24hr + 1x prostat (1320ml TV, 2080kcal, 99g, 1003ml water). Additional fluid per MD. Start at 30ml and advance as tolerated by 10ml q4h until goal. Monitor for s/s of intolerance and maintain aspiration precautions./Composition/Rate/Route

## 2018-08-14 NOTE — PROGRESS NOTE ADULT - SUBJECTIVE AND OBJECTIVE BOX
INTERVAL HPI/OVERNIGHT EVENTS:    PRESSORS: [ ] YES [ ] NO  WHICH:  DOSE:    ANTIBIOTICS:                  DATE STARTED:  ANTIBIOTICS:                  DATE STARTED:  ANTIBIOTICS:                  DATE STARTED:    MEDICATIONS  (STANDING):  aztreonam  IVPB 2000 milliGRAM(s) IV Intermittent <User Schedule>  chlorhexidine 0.12% Liquid 15 milliLiter(s) Swish and Spit two times a day  chlorhexidine 2% Cloths 1 Application(s) Topical daily  clindamycin IVPB      clindamycin IVPB 600 milliGRAM(s) IV Intermittent every 8 hours  fentaNYL   Infusion 0.5 MICROgram(s)/kG/Hr (3.175 mL/Hr) IV Continuous <Continuous>  heparin  Injectable 5000 Unit(s) SubCutaneous every 8 hours  insulin lispro (HumaLOG) corrective regimen sliding scale   SubCutaneous every 6 hours  midazolam Infusion 0.02 mG/kG/Hr (1.27 mL/Hr) IV Continuous <Continuous>  norepinephrine Infusion 0.05 MICROgram(s)/kG/Min (5.953 mL/Hr) IV Continuous <Continuous>  pantoprazole  Injectable 40 milliGRAM(s) IV Push daily  propofol Infusion 7.874 MICROgram(s)/kG/Min (3 mL/Hr) IV Continuous <Continuous>  sodium chloride 0.9% 1000 milliLiter(s) (100 mL/Hr) IV Continuous <Continuous>  sodium chloride 0.9%. 1000 milliLiter(s) (100 mL/Hr) IV Continuous <Continuous>  vancomycin  IVPB 1000 milliGRAM(s) IV Intermittent every 12 hours    MEDICATIONS  (PRN):      Drug Dosing Weight  Height (cm): 172.72 (13 Aug 2018 01:41)  Weight (kg): 63.5 (12 Aug 2018 19:37)  BMI (kg/m2): 21.3 (13 Aug 2018 01:41)  BSA (m2): 1.76 (13 Aug 2018 01:41)    CENTRAL LINE: [ ] YES [ ] NO  LOCATION:   DATE INSERTED:  REMOVE: [ ] YES [ ] NO  EXPLAIN:    BAILON: [ ] YES [ ] NO    DATE INSERTED:  REMOVE: [ ] YES [ ] NO  EXPLAIN:    A-LINE: [ ] YES [ ] NO  LOCATION:   DATE INSERTED:  REMOVE: [ ] YES [ ] NO  EXPLAIN:    PAST MEDICAL & SURGICAL HISTORY:  Heroin use  Schizophrenia  Alcohol abuse  No significant past surgical history      REVIEW OF SYSTEMS      General:	    Skin/Breast:  	  Ophthalmologic:  	  ENMT:	    Respiratory and Thorax:  	  Cardiovascular:	    Gastrointestinal:	    Genitourinary:	    Musculoskeletal:	    Neurological:	    Psychiatric:	    Hematology/Lymphatics:	    Endocrine:	    Allergic/Immunologic:	    ICU Vital Signs Last 24 Hrs  T(C): 38.7 (13 Aug 2018 21:52), Max: 38.7 (13 Aug 2018 21:52)  T(F): 101.7 (13 Aug 2018 21:52), Max: 101.7 (13 Aug 2018 21:52)  HR: 56 (14 Aug 2018 07:00) (52 - 94)  BP: 127/70 (14 Aug 2018 07:00) (73/38 - 140/61)  BP(mean): 89 (14 Aug 2018 07:00) (49 - 89)  ABP: 110/49 (13 Aug 2018 15:50) (81/37 - 130/70)  ABP(mean): 52 (13 Aug 2018 13:00) (52 - 92)  RR: 14 (14 Aug 2018 07:00) (12 - 17)  SpO2: 100% (14 Aug 2018 07:48) (92% - 100%)      ABG - ( 13 Aug 2018 03:56 )  pH, Arterial: 7.38  pH, Blood: x     /  pCO2: 41    /  pO2: 138   / HCO3: 23    / Base Excess: -1.6  /  SaO2: 99                  I&O's Detail    13 Aug 2018 07:01  -  14 Aug 2018 07:00  --------------------------------------------------------  IN:    fentaNYL  Infusion: 86 mL    IV PiggyBack: 350 mL    midazolam Infusion: 65 mL    norepinephrine Infusion: 279.2 mL    propofol Infusion: 204.8 mL    propofol Infusion: 90 mL    sodium chloride 0.9%: 1300 mL    sodium chloride 0.9%.: 1000 mL  Total IN: 3375 mL    OUT:    Indwelling Catheter - Urethral: 1672 mL  Total OUT: 1672 mL    Total NET: 1703 mL      14 Aug 2018 07:01  -  14 Aug 2018 07:58  --------------------------------------------------------  IN:    fentaNYL  Infusion: 5 mL    midazolam Infusion: 2 mL    norepinephrine Infusion: 13 mL    sodium chloride 0.9%: 100 mL  Total IN: 120 mL    OUT:    Indwelling Catheter - Urethral: 45 mL  Total OUT: 45 mL    Total NET: 75 mL          Mode: AC/ CMV (Assist Control/ Continuous Mandatory Ventilation)  RR (machine): 12  TV (machine): 500  FiO2: 40  PEEP: 5  ITime: 1  MAP: 8  PIP: 14      Physical exam:      LABS:  CBC Full  -  ( 14 Aug 2018 06:42 )  WBC Count : 21.1 K/uL  Hemoglobin : 9.7 g/dL  Hematocrit : 30.1 %  Platelet Count - Automated : 338 K/uL  Mean Cell Volume : 94.7 fL  Mean Cell Hemoglobin : 30.5 pg  Mean Cell Hemoglobin Concentration : 32.2 g/dL  Auto Neutrophil # : x  Auto Lymphocyte # : x  Auto Monocyte # : x  Auto Eosinophil # : x  Auto Basophil # : x  Auto Neutrophil % : x  Auto Lymphocyte % : x  Auto Monocyte % : x  Auto Eosinophil % : x  Auto Basophil % : x    08-14    140  |  107  |  8   ----------------------------<  112<H>  3.9   |  24  |  0.71    Ca    7.4<L>      14 Aug 2018 06:42  Phos  3.4     08-14  Mg     2.1     08-14    TPro  4.7<L>  /  Alb  1.9<L>  /  TBili  0.3  /  DBili  <0.2  /  AST  42<H>  /  ALT  33  /  AlkPhos  73  08-13    PT/INR - ( 13 Aug 2018 01:47 )   PT: 13.4 sec;   INR: 1.20          PTT - ( 13 Aug 2018 01:47 )  PTT:49.7 sec      RADIOLOGY & ADDITIONAL STUDIES:    CRITICAL CARE TIME SPENT: INTERVAL HPI/OVERNIGHT EVENTS:  O/N: new RIJ TLC inserted, [place] positional, bolused for low UOP but once out of t-armenta, poured out urine  8/13: HCV positive, HIV negative. decontaminated w/ permethrin, pus at penis - sent GC/CH, switch from propofol to versed gtts. echo ordered.     Pt seen and examined at bedside. Intubated, sedated.     PRESSORS: [x ] YES [ ] NO  WHICH: Levophed  DOSE: 13 Ml     ANTIBIOTICS:        Aztreonam           DATE STARTED: 8/12  ANTIBIOTICS:        Vancomycin          DATE STARTED: 8/12  ANTIBIOTICS:        Clyndamycin         DATE STARTED: 8/13    MEDICATIONS  (STANDING):  aztreonam  IVPB 2000 milliGRAM(s) IV Intermittent <User Schedule>  chlorhexidine 0.12% Liquid 15 milliLiter(s) Swish and Spit two times a day  chlorhexidine 2% Cloths 1 Application(s) Topical daily  clindamycin IVPB      clindamycin IVPB 600 milliGRAM(s) IV Intermittent every 8 hours  fentaNYL   Infusion 0.5 MICROgram(s)/kG/Hr (3.175 mL/Hr) IV Continuous <Continuous>  heparin  Injectable 5000 Unit(s) SubCutaneous every 8 hours  insulin lispro (HumaLOG) corrective regimen sliding scale   SubCutaneous every 6 hours  midazolam Infusion 0.02 mG/kG/Hr (1.27 mL/Hr) IV Continuous <Continuous>  norepinephrine Infusion 0.05 MICROgram(s)/kG/Min (5.953 mL/Hr) IV Continuous <Continuous>  pantoprazole  Injectable 40 milliGRAM(s) IV Push daily  propofol Infusion 7.874 MICROgram(s)/kG/Min (3 mL/Hr) IV Continuous <Continuous>  sodium chloride 0.9% 1000 milliLiter(s) (100 mL/Hr) IV Continuous <Continuous>  sodium chloride 0.9%. 1000 milliLiter(s) (100 mL/Hr) IV Continuous <Continuous>  vancomycin  IVPB 1000 milliGRAM(s) IV Intermittent every 12 hours    MEDICATIONS  (PRN):      Drug Dosing Weight  Height (cm): 172.72 (13 Aug 2018 01:41)  Weight (kg): 63.5 (12 Aug 2018 19:37)  BMI (kg/m2): 21.3 (13 Aug 2018 01:41)  BSA (m2): 1.76 (13 Aug 2018 01:41)    CENTRAL LINE: [x ] YES [ ] NO  LOCATION: R IJ   DATE INSERTED: 8/13  REMOVE: [ ] YES [x ] NO  EXPLAIN: Continued presser requirement    BAILON: [ x] YES [ ] NO    DATE INSERTED: 8/12  REMOVE: [ ] YES [x ] NO  EXPLAIN: Strict i&o in critically ill patient    A-LINE: [ ] YES [ x] NO  LOCATION:   DATE INSERTED:  REMOVE: [ ] YES [ ] NO  EXPLAIN:    PAST MEDICAL & SURGICAL HISTORY:  Heroin use  Schizophrenia  Alcohol abuse  No significant past surgical history      REVIEW OF SYSTEMS      Intubated/ sedated      ICU Vital Signs Last 24 Hrs  T(C): 38.7 (13 Aug 2018 21:52), Max: 38.7 (13 Aug 2018 21:52)  T(F): 101.7 (13 Aug 2018 21:52), Max: 101.7 (13 Aug 2018 21:52)  HR: 56 (14 Aug 2018 07:00) (52 - 94)  BP: 127/70 (14 Aug 2018 07:00) (73/38 - 140/61)  BP(mean): 89 (14 Aug 2018 07:00) (49 - 89)  ABP: 110/49 (13 Aug 2018 15:50) (81/37 - 130/70)  ABP(mean): 52 (13 Aug 2018 13:00) (52 - 92)  RR: 14 (14 Aug 2018 07:00) (12 - 17)  SpO2: 100% (14 Aug 2018 07:48) (92% - 100%)      ABG - ( 13 Aug 2018 03:56 )  pH, Arterial: 7.38  pH, Blood: x     /  pCO2: 41    /  pO2: 138   / HCO3: 23    / Base Excess: -1.6  /  SaO2: 99                  I&O's Detail    13 Aug 2018 07:01  -  14 Aug 2018 07:00  --------------------------------------------------------  IN:    fentaNYL  Infusion: 86 mL    IV PiggyBack: 350 mL    midazolam Infusion: 65 mL    norepinephrine Infusion: 279.2 mL    propofol Infusion: 204.8 mL    propofol Infusion: 90 mL    sodium chloride 0.9%: 1300 mL    sodium chloride 0.9%.: 1000 mL  Total IN: 3375 mL    OUT:    Indwelling Catheter - Urethral: 1672 mL  Total OUT: 1672 mL    Total NET: 1703 mL      14 Aug 2018 07:01  -  14 Aug 2018 07:58  --------------------------------------------------------  IN:    fentaNYL  Infusion: 5 mL    midazolam Infusion: 2 mL    norepinephrine Infusion: 13 mL    sodium chloride 0.9%: 100 mL  Total IN: 120 mL    OUT:    Indwelling Catheter - Urethral: 45 mL  Total OUT: 45 mL    Total NET: 75 mL          Mode: AC/ CMV (Assist Control/ Continuous Mandatory Ventilation)  RR (machine): 12  TV (machine): 500  FiO2: 40  PEEP: 5  ITime: 1  MAP: 8  PIP: 14      Physical exam:  Neurological: sedated, but moves all extremities when on lower sedatives.   HEENT: NT NC trachea midline. No JVD  Cardiovascular: RRR  Respiratory: CTABL   Gastrointestinal: soft, NT, ND, BS+  Extremities: warm, mild edema in all extremities  Vascular: no cyanosis/erythema  SKIN: multiple areas of excoration in both arms and legs. right antecub area with small area of eschar.     LABS:  CBC Full  -  ( 14 Aug 2018 06:42 )  WBC Count : 21.1 K/uL  Hemoglobin : 9.7 g/dL  Hematocrit : 30.1 %  Platelet Count - Automated : 338 K/uL  Mean Cell Volume : 94.7 fL  Mean Cell Hemoglobin : 30.5 pg  Mean Cell Hemoglobin Concentration : 32.2 g/dL  Auto Neutrophil # : x  Auto Lymphocyte # : x  Auto Monocyte # : x  Auto Eosinophil # : x  Auto Basophil # : x  Auto Neutrophil % : x  Auto Lymphocyte % : x  Auto Monocyte % : x  Auto Eosinophil % : x  Auto Basophil % : x    08-14    140  |  107  |  8   ----------------------------<  112<H>  3.9   |  24  |  0.71    Ca    7.4<L>      14 Aug 2018 06:42  Phos  3.4     08-14  Mg     2.1     08-14    TPro  4.7<L>  /  Alb  1.9<L>  /  TBili  0.3  /  DBili  <0.2  /  AST  42<H>  /  ALT  33  /  AlkPhos  73  08-13    PT/INR - ( 13 Aug 2018 01:47 )   PT: 13.4 sec;   INR: 1.20          PTT - ( 13 Aug 2018 01:47 )  PTT:49.7 sec      RADIOLOGY & ADDITIONAL STUDIES:    CRITICAL CARE TIME SPENT:

## 2018-08-14 NOTE — BRIEF OPERATIVE NOTE - PROCEDURE
<<-----Click on this checkbox to enter Procedure Vascular surgery procedure  08/14/2018  Washout and debridement of RLE  Active  [facility]

## 2018-08-14 NOTE — BRIEF OPERATIVE NOTE - OPERATION/FINDINGS
Most muscles viable with areas of focal necrosis.   A pocket of puss identified at the posterior-lateral thigh

## 2018-08-15 DIAGNOSIS — F05 DELIRIUM DUE TO KNOWN PHYSIOLOGICAL CONDITION: ICD-10-CM

## 2018-08-15 LAB
ANION GAP SERPL CALC-SCNC: 7 MMOL/L — SIGNIFICANT CHANGE UP (ref 5–17)
BUN SERPL-MCNC: 9 MG/DL — SIGNIFICANT CHANGE UP (ref 7–23)
CALCIUM SERPL-MCNC: 7.6 MG/DL — LOW (ref 8.4–10.5)
CHLORIDE SERPL-SCNC: 102 MMOL/L — SIGNIFICANT CHANGE UP (ref 96–108)
CO2 SERPL-SCNC: 27 MMOL/L — SIGNIFICANT CHANGE UP (ref 22–31)
CREAT SERPL-MCNC: 0.77 MG/DL — SIGNIFICANT CHANGE UP (ref 0.5–1.3)
GLUCOSE BLDC GLUCOMTR-MCNC: 113 MG/DL — HIGH (ref 70–99)
GLUCOSE BLDC GLUCOMTR-MCNC: 127 MG/DL — HIGH (ref 70–99)
GLUCOSE BLDC GLUCOMTR-MCNC: 77 MG/DL — SIGNIFICANT CHANGE UP (ref 70–99)
GLUCOSE SERPL-MCNC: 95 MG/DL — SIGNIFICANT CHANGE UP (ref 70–99)
HCT VFR BLD CALC: 27.1 % — LOW (ref 39–50)
HGB BLD-MCNC: 8.6 G/DL — LOW (ref 13–17)
MAGNESIUM SERPL-MCNC: 1.9 MG/DL — SIGNIFICANT CHANGE UP (ref 1.6–2.6)
MCHC RBC-ENTMCNC: 30.2 PG — SIGNIFICANT CHANGE UP (ref 27–34)
MCHC RBC-ENTMCNC: 31.7 G/DL — LOW (ref 32–36)
MCV RBC AUTO: 95.1 FL — SIGNIFICANT CHANGE UP (ref 80–100)
PHOSPHATE SERPL-MCNC: 3.1 MG/DL — SIGNIFICANT CHANGE UP (ref 2.5–4.5)
PLATELET # BLD AUTO: 354 K/UL — SIGNIFICANT CHANGE UP (ref 150–400)
POTASSIUM SERPL-MCNC: 3.7 MMOL/L — SIGNIFICANT CHANGE UP (ref 3.5–5.3)
POTASSIUM SERPL-SCNC: 3.7 MMOL/L — SIGNIFICANT CHANGE UP (ref 3.5–5.3)
RBC # BLD: 2.85 M/UL — LOW (ref 4.2–5.8)
RBC # FLD: 13.6 % — SIGNIFICANT CHANGE UP (ref 10.3–16.9)
SODIUM SERPL-SCNC: 136 MMOL/L — SIGNIFICANT CHANGE UP (ref 135–145)
WBC # BLD: 16.6 K/UL — HIGH (ref 3.8–10.5)
WBC # FLD AUTO: 16.6 K/UL — HIGH (ref 3.8–10.5)

## 2018-08-15 PROCEDURE — 71045 X-RAY EXAM CHEST 1 VIEW: CPT | Mod: 26

## 2018-08-15 PROCEDURE — 11043 DBRDMT MUSC&/FSCA 1ST 20/<: CPT | Mod: GC

## 2018-08-15 PROCEDURE — 11046 DBRDMT MUSC&/FSCA EA ADDL: CPT | Mod: GC

## 2018-08-15 PROCEDURE — 99222 1ST HOSP IP/OBS MODERATE 55: CPT

## 2018-08-15 PROCEDURE — 99291 CRITICAL CARE FIRST HOUR: CPT

## 2018-08-15 RX ORDER — DEXMEDETOMIDINE HYDROCHLORIDE IN 0.9% SODIUM CHLORIDE 4 UG/ML
0.32 INJECTION INTRAVENOUS
Qty: 200 | Refills: 0 | Status: DISCONTINUED | OUTPATIENT
Start: 2018-08-15 | End: 2018-08-15

## 2018-08-15 RX ORDER — ACETAMINOPHEN 500 MG
650 TABLET ORAL ONCE
Qty: 0 | Refills: 0 | Status: COMPLETED | OUTPATIENT
Start: 2018-08-15 | End: 2018-08-15

## 2018-08-15 RX ORDER — HYDROMORPHONE HYDROCHLORIDE 2 MG/ML
0.5 INJECTION INTRAMUSCULAR; INTRAVENOUS; SUBCUTANEOUS EVERY 4 HOURS
Qty: 0 | Refills: 0 | Status: DISCONTINUED | OUTPATIENT
Start: 2018-08-15 | End: 2018-08-16

## 2018-08-15 RX ORDER — HYDROMORPHONE HYDROCHLORIDE 2 MG/ML
0.5 INJECTION INTRAMUSCULAR; INTRAVENOUS; SUBCUTANEOUS ONCE
Qty: 0 | Refills: 0 | Status: DISCONTINUED | OUTPATIENT
Start: 2018-08-15 | End: 2018-08-15

## 2018-08-15 RX ORDER — HYDROMORPHONE HYDROCHLORIDE 2 MG/ML
1 INJECTION INTRAMUSCULAR; INTRAVENOUS; SUBCUTANEOUS EVERY 4 HOURS
Qty: 0 | Refills: 0 | Status: DISCONTINUED | OUTPATIENT
Start: 2018-08-15 | End: 2018-08-16

## 2018-08-15 RX ORDER — HALOPERIDOL DECANOATE 100 MG/ML
1 INJECTION INTRAMUSCULAR EVERY 6 HOURS
Qty: 0 | Refills: 0 | Status: DISCONTINUED | OUTPATIENT
Start: 2018-08-15 | End: 2018-08-22

## 2018-08-15 RX ADMIN — Medication 250 MILLIGRAM(S): at 22:51

## 2018-08-15 RX ADMIN — HYDROMORPHONE HYDROCHLORIDE 1 MILLIGRAM(S): 2 INJECTION INTRAMUSCULAR; INTRAVENOUS; SUBCUTANEOUS at 12:45

## 2018-08-15 RX ADMIN — DEXMEDETOMIDINE HYDROCHLORIDE IN 0.9% SODIUM CHLORIDE 5 MICROGRAM(S)/KG/HR: 4 INJECTION INTRAVENOUS at 09:08

## 2018-08-15 RX ADMIN — Medication 100 MILLIGRAM(S): at 02:00

## 2018-08-15 RX ADMIN — CHLORHEXIDINE GLUCONATE 1 APPLICATION(S): 213 SOLUTION TOPICAL at 13:49

## 2018-08-15 RX ADMIN — Medication 650 MILLIGRAM(S): at 21:28

## 2018-08-15 RX ADMIN — MIDAZOLAM HYDROCHLORIDE 1.27 MG/KG/HR: 1 INJECTION, SOLUTION INTRAMUSCULAR; INTRAVENOUS at 06:09

## 2018-08-15 RX ADMIN — HYDROMORPHONE HYDROCHLORIDE 1 MILLIGRAM(S): 2 INJECTION INTRAMUSCULAR; INTRAVENOUS; SUBCUTANEOUS at 12:46

## 2018-08-15 RX ADMIN — CHLORHEXIDINE GLUCONATE 15 MILLILITER(S): 213 SOLUTION TOPICAL at 19:09

## 2018-08-15 RX ADMIN — Medication 1 MILLIGRAM(S): at 12:12

## 2018-08-15 RX ADMIN — Medication 100 MILLIGRAM(S): at 19:07

## 2018-08-15 RX ADMIN — PANTOPRAZOLE SODIUM 40 MILLIGRAM(S): 20 TABLET, DELAYED RELEASE ORAL at 12:10

## 2018-08-15 RX ADMIN — HEPARIN SODIUM 5000 UNIT(S): 5000 INJECTION INTRAVENOUS; SUBCUTANEOUS at 21:28

## 2018-08-15 RX ADMIN — Medication 100 MILLIGRAM(S): at 12:09

## 2018-08-15 RX ADMIN — Medication 100 MILLIGRAM(S): at 12:10

## 2018-08-15 RX ADMIN — Medication 100 MILLIGRAM(S): at 05:45

## 2018-08-15 RX ADMIN — Medication 100 MILLIGRAM(S): at 19:40

## 2018-08-15 RX ADMIN — HEPARIN SODIUM 5000 UNIT(S): 5000 INJECTION INTRAVENOUS; SUBCUTANEOUS at 14:50

## 2018-08-15 RX ADMIN — SODIUM CHLORIDE 100 MILLILITER(S): 9 INJECTION INTRAMUSCULAR; INTRAVENOUS; SUBCUTANEOUS at 13:50

## 2018-08-15 RX ADMIN — HYDROMORPHONE HYDROCHLORIDE 0.5 MILLIGRAM(S): 2 INJECTION INTRAMUSCULAR; INTRAVENOUS; SUBCUTANEOUS at 15:22

## 2018-08-15 RX ADMIN — HEPARIN SODIUM 5000 UNIT(S): 5000 INJECTION INTRAVENOUS; SUBCUTANEOUS at 07:15

## 2018-08-15 RX ADMIN — Medication 250 MILLIGRAM(S): at 10:17

## 2018-08-15 RX ADMIN — Medication 5.95 MICROGRAM(S)/KG/MIN: at 10:27

## 2018-08-15 RX ADMIN — Medication 100 MILLIGRAM(S): at 09:09

## 2018-08-15 RX ADMIN — CHLORHEXIDINE GLUCONATE 15 MILLILITER(S): 213 SOLUTION TOPICAL at 07:14

## 2018-08-15 NOTE — CONSULT NOTE ADULT - PROBLEM SELECTOR RECOMMENDATION 9
May use Haldol 1 mg IV q 6 hrs prn severe agitation provided QTc is wnl.   Please reconsult once pt is more alert and is able to participate in the interview.

## 2018-08-15 NOTE — CONSULT NOTE ADULT - SUBJECTIVE AND OBJECTIVE BOX
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    Meds:  Allergies    penicillin (Unknown)  penicillins (Unknown)    Intolerances        Vitals: Vital Signs Last 24 Hrs  T(C): 37.7 (12 Aug 2018 22:12), Max: 38.4 (12 Aug 2018 19:37)  T(F): 99.8 (12 Aug 2018 22:12), Max: 101.2 (12 Aug 2018 19:37)  HR: 83 (12 Aug 2018 23:10) (83 - 113)  BP: 95/54 (12 Aug 2018 23:10) (89/45 - 96/52)  BP(mean): --  RR: 18 (12 Aug 2018 23:10) (18 - 22)  SpO2: 95% (12 Aug 2018 23:10) (95% - 96%)  CAPILLARY BLOOD GLUCOSE          Labs:                         11.5   23.9  )-----------( 269      ( 12 Aug 2018 19:55 )             34.1   08-12    131<L>  |  96  |  21  ----------------------------<  134<H>  4.4   |  29  |  1.23    Ca    8.7      12 Aug 2018 19:55  Phos  2.5     08-12  Mg     2.3     08-12    TPro  7.2  /  Alb  2.2<L>  /  TBili  0.4  /  DBili  x   /  AST  50<H>  /  ALT  45<H>  /  AlkPhos  88  08-12      Exam:  Neuro: A&Ox1+, NAD, grossly neuro intact  HEENT: PERRL < EOMI, MMM, small black insects noted around hair and beard  Neck: Supple  CV: RRR, no MRGs  Pulm: CTAB, no wheezes, rales ronchi  Abd: BS (+), Soft, NT, ND  : Higuera in place  Ext: RLE with erythema and significant edema extending from the hip to knee, significantly ttp in that region even to the sheet laying on him  Vasc: 2+ pulses - radial and PT  MSK: no joint swelling  Psych: waxing and waning mood, appears agitated at times and conversant, other times despondent and unwilling or unable to answer questions, flat affect throughouyt
Attempted to interview pt . Pt however was lethargic and incoherent. Pt was calm without evidence of agitation.   Unable to evaluate pt's history or capacity at this time secondary to sedation.

## 2018-08-15 NOTE — PROGRESS NOTE ADULT - ASSESSMENT
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   PT/OT: not ordered

## 2018-08-15 NOTE — PROGRESS NOTE ADULT - SUBJECTIVE AND OBJECTIVE BOX
INTERVAL HPI/OVERNIGHT EVENTS:    PRESSORS: [ ] YES [ ] NO  WHICH:  DOSE:    ANTIBIOTICS:                  DATE STARTED:  ANTIBIOTICS:                  DATE STARTED:  ANTIBIOTICS:                  DATE STARTED:    MEDICATIONS  (STANDING):  aztreonam  IVPB 2000 milliGRAM(s) IV Intermittent <User Schedule>  chlorhexidine 0.12% Liquid 15 milliLiter(s) Swish and Spit two times a day  chlorhexidine 2% Cloths 1 Application(s) Topical daily  clindamycin IVPB      clindamycin IVPB 600 milliGRAM(s) IV Intermittent every 8 hours  fentaNYL   Infusion 0.5 MICROgram(s)/kG/Hr (3.175 mL/Hr) IV Continuous <Continuous>  folic acid Injectable 1 milliGRAM(s) IV Push daily  heparin  Injectable 5000 Unit(s) SubCutaneous every 8 hours  insulin lispro (HumaLOG) corrective regimen sliding scale   SubCutaneous every 6 hours  midazolam Infusion 0.02 mG/kG/Hr (1.27 mL/Hr) IV Continuous <Continuous>  multivitamin 1 Tablet(s) Oral daily  norepinephrine Infusion 0.05 MICROgram(s)/kG/Min (5.953 mL/Hr) IV Continuous <Continuous>  pantoprazole  Injectable 40 milliGRAM(s) IV Push daily  propofol Infusion 7.874 MICROgram(s)/kG/Min (3 mL/Hr) IV Continuous <Continuous>  sodium chloride 0.9%. 1000 milliLiter(s) (100 mL/Hr) IV Continuous <Continuous>  thiamine Injectable 100 milliGRAM(s) IV Push daily  vancomycin  IVPB 1000 milliGRAM(s) IV Intermittent every 12 hours    MEDICATIONS  (PRN):      Drug Dosing Weight  Height (cm): 172.72 (14 Aug 2018 11:27)  Weight (kg): 63.5 (14 Aug 2018 11:27)  BMI (kg/m2): 21.3 (14 Aug 2018 11:27)  BSA (m2): 1.76 (14 Aug 2018 11:27)    CENTRAL LINE: [ ] YES [ ] NO  LOCATION:   DATE INSERTED:  REMOVE: [ ] YES [ ] NO  EXPLAIN:    BAILON: [ ] YES [ ] NO    DATE INSERTED:  REMOVE: [ ] YES [ ] NO  EXPLAIN:    A-LINE: [ ] YES [ ] NO  LOCATION:   DATE INSERTED:  REMOVE: [ ] YES [ ] NO  EXPLAIN:    PAST MEDICAL & SURGICAL HISTORY:  Heroin use  Schizophrenia  Alcohol abuse  No significant past surgical history      REVIEW OF SYSTEMS      General:	    Skin/Breast:  	  Ophthalmologic:  	  ENMT:	    Respiratory and Thorax:  	  Cardiovascular:	    Gastrointestinal:	    Genitourinary:	    Musculoskeletal:	    Neurological:	    Psychiatric:	    Hematology/Lymphatics:	    Endocrine:	    Allergic/Immunologic:	    ICU Vital Signs Last 24 Hrs  T(C): 36.2 (14 Aug 2018 20:45), Max: 37.6 (14 Aug 2018 11:27)  T(F): 97.2 (14 Aug 2018 20:45), Max: 99.7 (14 Aug 2018 11:27)  HR: 64 (15 Aug 2018 06:00) (56 - 76)  BP: 93/46 (14 Aug 2018 19:00) (93/46 - 128/72)  BP(mean): 62 (14 Aug 2018 19:00) (62 - 91)  ABP: 138/52 (15 Aug 2018 06:00) (116/40 - 150/48)  ABP(mean): 72 (15 Aug 2018 06:00) (56 - 86)  RR: 11 (15 Aug 2018 06:00) (6 - 16)  SpO2: 100% (15 Aug 2018 06:00) (99% - 100%)      ABG - ( 14 Aug 2018 17:10 )  pH, Arterial: 7.46  pH, Blood: x     /  pCO2: 35    /  pO2: 155   / HCO3: 25    / Base Excess: 1.1   /  SaO2: 99                  I&O's Detail    13 Aug 2018 07:01  -  14 Aug 2018 07:00  --------------------------------------------------------  IN:    fentaNYL  Infusion: 86 mL    IV PiggyBack: 350 mL    midazolam Infusion: 65 mL    norepinephrine Infusion: 279.2 mL    propofol Infusion: 204.8 mL    propofol Infusion: 90 mL    sodium chloride 0.9%: 1300 mL    sodium chloride 0.9%.: 1000 mL  Total IN: 3375 mL    OUT:    Indwelling Catheter - Urethral: 1672 mL  Total OUT: 1672 mL    Total NET: 1703 mL      14 Aug 2018 07:01  -  15 Aug 2018 06:46  --------------------------------------------------------  IN:    fentaNYL  Infusion: 110 mL    IV PiggyBack: 500 mL    midazolam Infusion: 71 mL    norepinephrine Infusion: 149 mL    propofol Infusion: 22 mL    sodium chloride 0.9%.: 2200 mL  Total IN: 3052 mL    OUT:    Indwelling Catheter - Urethral: 1175 mL  Total OUT: 1175 mL    Total NET: 1877 mL          Mode: AC/ CMV (Assist Control/ Continuous Mandatory Ventilation)  RR (machine): 12  TV (machine): 500  FiO2: 40  PEEP: 5  ITime: 1  MAP: 7.7  PIP: 19      Physical exam:      LABS:  CBC Full  -  ( 14 Aug 2018 15:41 )  WBC Count : 20.9 K/uL  Hemoglobin : 10.1 g/dL  Hematocrit : 31.3 %  Platelet Count - Automated : 367 K/uL  Mean Cell Volume : 94.8 fL  Mean Cell Hemoglobin : 30.6 pg  Mean Cell Hemoglobin Concentration : 32.3 g/dL  Auto Neutrophil # : x  Auto Lymphocyte # : x  Auto Monocyte # : x  Auto Eosinophil # : x  Auto Basophil # : x  Auto Neutrophil % : x  Auto Lymphocyte % : x  Auto Monocyte % : x  Auto Eosinophil % : x  Auto Basophil % : x    08-14    138  |  104  |  7   ----------------------------<  111<H>  3.9   |  27  |  0.70    Ca    7.5<L>      14 Aug 2018 15:41  Phos  3.3     08-14  Mg     1.9     08-14            RADIOLOGY & ADDITIONAL STUDIES:    CRITICAL CARE TIME SPENT: INTERVAL HPI/OVERNIGHT EVENTS:    8/14: taken to OR for:Debridement of thigh and new lateral incision to calf with drain placement x3. partial closure. Echo ef 55-60% no vegetation seen but recommend GYPSY for further dx. weaned off propofol new karissa placed as per vasc  o/n: agitated and sitting up in bed, propofol restarted    PRESSORS: [ x] YES [ ] NO  WHICH: Levophed  DOSE: 5mL    ANTIBIOTICS:     aztreonam               DATE STARTED:  ANTIBIOTICS:     clindamycin            DATE STARTED:  ANTIBIOTICS:     Vancomycin             DATE STARTED:    MEDICATIONS  (STANDING):  aztreonam  IVPB 2000 milliGRAM(s) IV Intermittent <User Schedule>  chlorhexidine 0.12% Liquid 15 milliLiter(s) Swish and Spit two times a day  chlorhexidine 2% Cloths 1 Application(s) Topical daily  clindamycin IVPB      clindamycin IVPB 600 milliGRAM(s) IV Intermittent every 8 hours  fentaNYL   Infusion 0.5 MICROgram(s)/kG/Hr (3.175 mL/Hr) IV Continuous <Continuous>  folic acid Injectable 1 milliGRAM(s) IV Push daily  heparin  Injectable 5000 Unit(s) SubCutaneous every 8 hours  insulin lispro (HumaLOG) corrective regimen sliding scale   SubCutaneous every 6 hours  midazolam Infusion 0.02 mG/kG/Hr (1.27 mL/Hr) IV Continuous <Continuous>  multivitamin 1 Tablet(s) Oral daily  norepinephrine Infusion 0.05 MICROgram(s)/kG/Min (5.953 mL/Hr) IV Continuous <Continuous>  pantoprazole  Injectable 40 milliGRAM(s) IV Push daily  propofol Infusion 7.874 MICROgram(s)/kG/Min (3 mL/Hr) IV Continuous <Continuous>  sodium chloride 0.9%. 1000 milliLiter(s) (100 mL/Hr) IV Continuous <Continuous>  thiamine Injectable 100 milliGRAM(s) IV Push daily  vancomycin  IVPB 1000 milliGRAM(s) IV Intermittent every 12 hours    MEDICATIONS  (PRN):      Drug Dosing Weight  Height (cm): 172.72 (14 Aug 2018 11:27)  Weight (kg): 63.5 (14 Aug 2018 11:27)  BMI (kg/m2): 21.3 (14 Aug 2018 11:27)  BSA (m2): 1.76 (14 Aug 2018 11:27)    CENTRAL LINE: [ ] YES [ ] NO  LOCATION:   DATE INSERTED:  REMOVE: [ ] YES [ ] NO  EXPLAIN:    BAILON: [ ] YES [ ] NO    DATE INSERTED:  REMOVE: [ ] YES [ ] NO  EXPLAIN:    A-LINE: [ ] YES [ ] NO  LOCATION:   DATE INSERTED:  REMOVE: [ ] YES [ ] NO  EXPLAIN:    PAST MEDICAL & SURGICAL HISTORY:  Heroin use  Schizophrenia  Alcohol abuse  No significant past surgical history      REVIEW OF SYSTEMS      General:	    Skin/Breast:  	  Ophthalmologic:  	  ENMT:	    Respiratory and Thorax:  	  Cardiovascular:	    Gastrointestinal:	    Genitourinary:	    Musculoskeletal:	    Neurological:	    Psychiatric:	    Hematology/Lymphatics:	    Endocrine:	    Allergic/Immunologic:	    ICU Vital Signs Last 24 Hrs  T(C): 36.2 (14 Aug 2018 20:45), Max: 37.6 (14 Aug 2018 11:27)  T(F): 97.2 (14 Aug 2018 20:45), Max: 99.7 (14 Aug 2018 11:27)  HR: 64 (15 Aug 2018 06:00) (56 - 76)  BP: 93/46 (14 Aug 2018 19:00) (93/46 - 128/72)  BP(mean): 62 (14 Aug 2018 19:00) (62 - 91)  ABP: 138/52 (15 Aug 2018 06:00) (116/40 - 150/48)  ABP(mean): 72 (15 Aug 2018 06:00) (56 - 86)  RR: 11 (15 Aug 2018 06:00) (6 - 16)  SpO2: 100% (15 Aug 2018 06:00) (99% - 100%)      ABG - ( 14 Aug 2018 17:10 )  pH, Arterial: 7.46  pH, Blood: x     /  pCO2: 35    /  pO2: 155   / HCO3: 25    / Base Excess: 1.1   /  SaO2: 99                  I&O's Detail    13 Aug 2018 07:01  -  14 Aug 2018 07:00  --------------------------------------------------------  IN:    fentaNYL  Infusion: 86 mL    IV PiggyBack: 350 mL    midazolam Infusion: 65 mL    norepinephrine Infusion: 279.2 mL    propofol Infusion: 204.8 mL    propofol Infusion: 90 mL    sodium chloride 0.9%: 1300 mL    sodium chloride 0.9%.: 1000 mL  Total IN: 3375 mL    OUT:    Indwelling Catheter - Urethral: 1672 mL  Total OUT: 1672 mL    Total NET: 1703 mL      14 Aug 2018 07:01  -  15 Aug 2018 06:46  --------------------------------------------------------  IN:    fentaNYL  Infusion: 110 mL    IV PiggyBack: 500 mL    midazolam Infusion: 71 mL    norepinephrine Infusion: 149 mL    propofol Infusion: 22 mL    sodium chloride 0.9%.: 2200 mL  Total IN: 3052 mL    OUT:    Indwelling Catheter - Urethral: 1175 mL  Total OUT: 1175 mL    Total NET: 1877 mL          Mode: AC/ CMV (Assist Control/ Continuous Mandatory Ventilation)  RR (machine): 12  TV (machine): 500  FiO2: 40  PEEP: 5  ITime: 1  MAP: 7.7  PIP: 19      Physical exam:      LABS:  CBC Full  -  ( 14 Aug 2018 15:41 )  WBC Count : 20.9 K/uL  Hemoglobin : 10.1 g/dL  Hematocrit : 31.3 %  Platelet Count - Automated : 367 K/uL  Mean Cell Volume : 94.8 fL  Mean Cell Hemoglobin : 30.6 pg  Mean Cell Hemoglobin Concentration : 32.3 g/dL  Auto Neutrophil # : x  Auto Lymphocyte # : x  Auto Monocyte # : x  Auto Eosinophil # : x  Auto Basophil # : x  Auto Neutrophil % : x  Auto Lymphocyte % : x  Auto Monocyte % : x  Auto Eosinophil % : x  Auto Basophil % : x    08-14    138  |  104  |  7   ----------------------------<  111<H>  3.9   |  27  |  0.70    Ca    7.5<L>      14 Aug 2018 15:41  Phos  3.3     08-14  Mg     1.9     08-14            RADIOLOGY & ADDITIONAL STUDIES:    CRITICAL CARE TIME SPENT: INTERVAL HPI/OVERNIGHT EVENTS:    8/14: taken to OR for:Debridement of thigh and new lateral incision to calf with drain placement x3. partial closure. Echo ef 55-60% no vegetation seen but recommend GYPSY for further dx. weaned off propofol new karissa placed as per vasc  o/n: agitated and sitting up in bed, propofol restarted    Pt seen and examined at bedside. Intubated, sedated.     PRESSORS: [ x] YES [ ] NO  WHICH: Levophed  DOSE: 5mL    ANTIBIOTICS:     aztreonam               DATE STARTED:  8/13  ANTIBIOTICS:     clindamycin              DATE STARTED:  8/13  ANTIBIOTICS:     Vancomycin             DATE STARTED:   8/13    MEDICATIONS  (STANDING):  aztreonam  IVPB 2000 milliGRAM(s) IV Intermittent <User Schedule>  chlorhexidine 0.12% Liquid 15 milliLiter(s) Swish and Spit two times a day  chlorhexidine 2% Cloths 1 Application(s) Topical daily  clindamycin IVPB      clindamycin IVPB 600 milliGRAM(s) IV Intermittent every 8 hours  fentaNYL   Infusion 0.5 MICROgram(s)/kG/Hr (3.175 mL/Hr) IV Continuous <Continuous>  folic acid Injectable 1 milliGRAM(s) IV Push daily  heparin  Injectable 5000 Unit(s) SubCutaneous every 8 hours  insulin lispro (HumaLOG) corrective regimen sliding scale   SubCutaneous every 6 hours  midazolam Infusion 0.02 mG/kG/Hr (1.27 mL/Hr) IV Continuous <Continuous>  multivitamin 1 Tablet(s) Oral daily  norepinephrine Infusion 0.05 MICROgram(s)/kG/Min (5.953 mL/Hr) IV Continuous <Continuous>  pantoprazole  Injectable 40 milliGRAM(s) IV Push daily  propofol Infusion 7.874 MICROgram(s)/kG/Min (3 mL/Hr) IV Continuous <Continuous>  sodium chloride 0.9%. 1000 milliLiter(s) (100 mL/Hr) IV Continuous <Continuous>  thiamine Injectable 100 milliGRAM(s) IV Push daily  vancomycin  IVPB 1000 milliGRAM(s) IV Intermittent every 12 hours    MEDICATIONS  (PRN):      Drug Dosing Weight  Height (cm): 172.72 (14 Aug 2018 11:27)  Weight (kg): 63.5 (14 Aug 2018 11:27)  BMI (kg/m2): 21.3 (14 Aug 2018 11:27)  BSA (m2): 1.76 (14 Aug 2018 11:27)    CENTRAL LINE: [x ] YES [ ] NO  LOCATION:  R IJ DATE INSERTED: 8/13  REMOVE: [ ] YES [x ] NO  EXPLAIN: Continued presser requirement / need for access     BAILON: [x ] YES [ ] NO    DATE INSERTED: 8/13  REMOVE: [ ] YES [x ] NO  EXPLAIN: Strict I/O in critically ill pt    A-LINE: [x ] YES [ ] NO  LOCATION:   DATE INSERTED: 8/14  REMOVE: [ ] YES [x ] NO  EXPLAIN: HD monitoring in critically ill pt    PAST MEDICAL & SURGICAL HISTORY:  Heroin use  Schizophrenia  Alcohol abuse  No significant past surgical history      REVIEW OF SYSTEMS    intubated, sedated      ICU Vital Signs Last 24 Hrs  T(C): 36.2 (14 Aug 2018 20:45), Max: 37.6 (14 Aug 2018 11:27)  T(F): 97.2 (14 Aug 2018 20:45), Max: 99.7 (14 Aug 2018 11:27)  HR: 64 (15 Aug 2018 06:00) (56 - 76)  BP: 93/46 (14 Aug 2018 19:00) (93/46 - 128/72)  BP(mean): 62 (14 Aug 2018 19:00) (62 - 91)  ABP: 138/52 (15 Aug 2018 06:00) (116/40 - 150/48)  ABP(mean): 72 (15 Aug 2018 06:00) (56 - 86)  RR: 11 (15 Aug 2018 06:00) (6 - 16)  SpO2: 100% (15 Aug 2018 06:00) (99% - 100%)      ABG - ( 14 Aug 2018 17:10 )  pH, Arterial: 7.46  pH, Blood: x     /  pCO2: 35    /  pO2: 155   / HCO3: 25    / Base Excess: 1.1   /  SaO2: 99                  I&O's Detail    13 Aug 2018 07:01  -  14 Aug 2018 07:00  --------------------------------------------------------  IN:    fentaNYL  Infusion: 86 mL    IV PiggyBack: 350 mL    midazolam Infusion: 65 mL    norepinephrine Infusion: 279.2 mL    propofol Infusion: 204.8 mL    propofol Infusion: 90 mL    sodium chloride 0.9%: 1300 mL    sodium chloride 0.9%.: 1000 mL  Total IN: 3375 mL    OUT:    Indwelling Catheter - Urethral: 1672 mL  Total OUT: 1672 mL    Total NET: 1703 mL      14 Aug 2018 07:01  -  15 Aug 2018 06:46  --------------------------------------------------------  IN:    fentaNYL  Infusion: 110 mL    IV PiggyBack: 500 mL    midazolam Infusion: 71 mL    norepinephrine Infusion: 149 mL    propofol Infusion: 22 mL    sodium chloride 0.9%.: 2200 mL  Total IN: 3052 mL    OUT:    Indwelling Catheter - Urethral: 1175 mL  Total OUT: 1175 mL    Total NET: 1877 mL          Mode: AC/ CMV (Assist Control/ Continuous Mandatory Ventilation)  RR (machine): 12  TV (machine): 500  FiO2: 40  PEEP: 5  ITime: 1  MAP: 7.7  PIP: 19      Physical exam:  Neurological: sedated, but moves all extremities when on lower sedatives.   HEENT: NT NC trachea midline. No JVD  Cardiovascular: RRR  Respiratory: CTABL   Gastrointestinal: soft, NT, ND, BS+  Extremities: warm, mild edema in all extremities, iodoform dressing in place R thigh with s/s to ALEKSANDAR x3   Vascular: no cyanosis/erythema  SKIN: multiple areas of excoration in both arms and legs. right antecub area with small area of eschar.     LABS:  CBC Full  -  ( 14 Aug 2018 15:41 )  WBC Count : 20.9 K/uL  Hemoglobin : 10.1 g/dL  Hematocrit : 31.3 %  Platelet Count - Automated : 367 K/uL  Mean Cell Volume : 94.8 fL  Mean Cell Hemoglobin : 30.6 pg  Mean Cell Hemoglobin Concentration : 32.3 g/dL  Auto Neutrophil # : x  Auto Lymphocyte # : x  Auto Monocyte # : x  Auto Eosinophil # : x  Auto Basophil # : x  Auto Neutrophil % : x  Auto Lymphocyte % : x  Auto Monocyte % : x  Auto Eosinophil % : x  Auto Basophil % : x    08-14    138  |  104  |  7   ----------------------------<  111<H>  3.9   |  27  |  0.70    Ca    7.5<L>      14 Aug 2018 15:41  Phos  3.3     08-14  Mg     1.9     08-14            RADIOLOGY & ADDITIONAL STUDIES:    CRITICAL CARE TIME SPENT:

## 2018-08-15 NOTE — CONSULT NOTE ADULT - ASSESSMENT
27 yo M h/o IVDU (meth, heroin) Etoh abuse, schizophrenia p/w LLE pain and erythema found to have gas gangrene    Plan for immediate OR debridement of gas gangrene, SICU plan upon return    Neuro: propofol gtt/fentanyl gtt  HEENT: decontamination of insects  CV: levophed gtt, normotensive goals MAP > 65  Pulm: Intubated AC   GI/FEN: NPO/NGT  : [place]  ID: Aztreonam (8/13-), Vanco (8/13-), Clinda (8/13-)  Endo: ISS  Heme: HSQ  PPX: SCDs  Lines: PIV, [place]  Wounds: RLE  PT/OT: not ordered
Unable to evaluate pt secondary to sedation. Please reconsult once pt is more alert. No evidence of agitation at this time.

## 2018-08-16 LAB
-  CEFTRIAXONE: SIGNIFICANT CHANGE UP
-  CLINDAMYCIN: SIGNIFICANT CHANGE UP
-  ERYTHROMYCIN: SIGNIFICANT CHANGE UP
-  PENICILLIN: SIGNIFICANT CHANGE UP
-  VANCOMYCIN: SIGNIFICANT CHANGE UP
ANION GAP SERPL CALC-SCNC: 8 MMOL/L — SIGNIFICANT CHANGE UP (ref 5–17)
BUN SERPL-MCNC: 8 MG/DL — SIGNIFICANT CHANGE UP (ref 7–23)
CALCIUM SERPL-MCNC: 7.8 MG/DL — LOW (ref 8.4–10.5)
CHLORIDE SERPL-SCNC: 100 MMOL/L — SIGNIFICANT CHANGE UP (ref 96–108)
CO2 SERPL-SCNC: 27 MMOL/L — SIGNIFICANT CHANGE UP (ref 22–31)
CREAT SERPL-MCNC: 0.7 MG/DL — SIGNIFICANT CHANGE UP (ref 0.5–1.3)
CULTURE RESULTS: SIGNIFICANT CHANGE UP
GLUCOSE BLDC GLUCOMTR-MCNC: 107 MG/DL — HIGH (ref 70–99)
GLUCOSE BLDC GLUCOMTR-MCNC: 110 MG/DL — HIGH (ref 70–99)
GLUCOSE BLDC GLUCOMTR-MCNC: 93 MG/DL — SIGNIFICANT CHANGE UP (ref 70–99)
GLUCOSE BLDC GLUCOMTR-MCNC: 97 MG/DL — SIGNIFICANT CHANGE UP (ref 70–99)
GLUCOSE BLDC GLUCOMTR-MCNC: 98 MG/DL — SIGNIFICANT CHANGE UP (ref 70–99)
GLUCOSE SERPL-MCNC: 100 MG/DL — HIGH (ref 70–99)
HCT VFR BLD CALC: 27.7 % — LOW (ref 39–50)
HGB BLD-MCNC: 9.1 G/DL — LOW (ref 13–17)
MAGNESIUM SERPL-MCNC: 1.9 MG/DL — SIGNIFICANT CHANGE UP (ref 1.6–2.6)
MCHC RBC-ENTMCNC: 30.5 PG — SIGNIFICANT CHANGE UP (ref 27–34)
MCHC RBC-ENTMCNC: 32.9 G/DL — SIGNIFICANT CHANGE UP (ref 32–36)
MCV RBC AUTO: 93 FL — SIGNIFICANT CHANGE UP (ref 80–100)
METHOD TYPE: SIGNIFICANT CHANGE UP
ORGANISM # SPEC MICROSCOPIC CNT: SIGNIFICANT CHANGE UP
PHOSPHATE SERPL-MCNC: 2.7 MG/DL — SIGNIFICANT CHANGE UP (ref 2.5–4.5)
PLATELET # BLD AUTO: 386 K/UL — SIGNIFICANT CHANGE UP (ref 150–400)
POTASSIUM SERPL-MCNC: 3.8 MMOL/L — SIGNIFICANT CHANGE UP (ref 3.5–5.3)
POTASSIUM SERPL-SCNC: 3.8 MMOL/L — SIGNIFICANT CHANGE UP (ref 3.5–5.3)
RBC # BLD: 2.98 M/UL — LOW (ref 4.2–5.8)
RBC # FLD: 13.1 % — SIGNIFICANT CHANGE UP (ref 10.3–16.9)
SODIUM SERPL-SCNC: 135 MMOL/L — SIGNIFICANT CHANGE UP (ref 135–145)
SPECIMEN SOURCE: SIGNIFICANT CHANGE UP
VANCOMYCIN TROUGH SERPL-MCNC: 8.4 UG/ML — LOW (ref 10–20)
WBC # BLD: 12.9 K/UL — HIGH (ref 3.8–10.5)
WBC # FLD AUTO: 12.9 K/UL — HIGH (ref 3.8–10.5)

## 2018-08-16 PROCEDURE — 99291 CRITICAL CARE FIRST HOUR: CPT

## 2018-08-16 PROCEDURE — 71045 X-RAY EXAM CHEST 1 VIEW: CPT | Mod: 26

## 2018-08-16 PROCEDURE — 99233 SBSQ HOSP IP/OBS HIGH 50: CPT

## 2018-08-16 RX ORDER — THIAMINE MONONITRATE (VIT B1) 100 MG
100 TABLET ORAL DAILY
Qty: 0 | Refills: 0 | Status: DISCONTINUED | OUTPATIENT
Start: 2018-08-16 | End: 2018-08-22

## 2018-08-16 RX ORDER — VANCOMYCIN HCL 1 G
1250 VIAL (EA) INTRAVENOUS EVERY 12 HOURS
Qty: 0 | Refills: 0 | Status: DISCONTINUED | OUTPATIENT
Start: 2018-08-16 | End: 2018-08-17

## 2018-08-16 RX ORDER — FOLIC ACID 0.8 MG
1 TABLET ORAL DAILY
Qty: 0 | Refills: 0 | Status: DISCONTINUED | OUTPATIENT
Start: 2018-08-16 | End: 2018-08-22

## 2018-08-16 RX ORDER — OXYCODONE HYDROCHLORIDE 5 MG/1
5 TABLET ORAL EVERY 4 HOURS
Qty: 0 | Refills: 0 | Status: DISCONTINUED | OUTPATIENT
Start: 2018-08-16 | End: 2018-08-16

## 2018-08-16 RX ORDER — HYDROMORPHONE HYDROCHLORIDE 2 MG/ML
30 INJECTION INTRAMUSCULAR; INTRAVENOUS; SUBCUTANEOUS
Qty: 0 | Refills: 0 | Status: DISCONTINUED | OUTPATIENT
Start: 2018-08-16 | End: 2018-08-22

## 2018-08-16 RX ORDER — HYDROMORPHONE HYDROCHLORIDE 2 MG/ML
2 INJECTION INTRAMUSCULAR; INTRAVENOUS; SUBCUTANEOUS ONCE
Qty: 0 | Refills: 0 | Status: DISCONTINUED | OUTPATIENT
Start: 2018-08-16 | End: 2018-08-16

## 2018-08-16 RX ORDER — POTASSIUM CHLORIDE 20 MEQ
20 PACKET (EA) ORAL ONCE
Qty: 0 | Refills: 0 | Status: COMPLETED | OUTPATIENT
Start: 2018-08-16 | End: 2018-08-16

## 2018-08-16 RX ORDER — HYDROMORPHONE HYDROCHLORIDE 2 MG/ML
0.5 INJECTION INTRAMUSCULAR; INTRAVENOUS; SUBCUTANEOUS EVERY 4 HOURS
Qty: 0 | Refills: 0 | Status: DISCONTINUED | OUTPATIENT
Start: 2018-08-16 | End: 2018-08-16

## 2018-08-16 RX ORDER — HYDROMORPHONE HYDROCHLORIDE 2 MG/ML
30 INJECTION INTRAMUSCULAR; INTRAVENOUS; SUBCUTANEOUS
Qty: 0 | Refills: 0 | Status: DISCONTINUED | OUTPATIENT
Start: 2018-08-16 | End: 2018-08-16

## 2018-08-16 RX ORDER — HYDROMORPHONE HYDROCHLORIDE 2 MG/ML
1 INJECTION INTRAMUSCULAR; INTRAVENOUS; SUBCUTANEOUS
Qty: 0 | Refills: 0 | Status: DISCONTINUED | OUTPATIENT
Start: 2018-08-16 | End: 2018-08-20

## 2018-08-16 RX ORDER — VANCOMYCIN HCL 1 G
250 VIAL (EA) INTRAVENOUS ONCE
Qty: 0 | Refills: 0 | Status: COMPLETED | OUTPATIENT
Start: 2018-08-16 | End: 2018-08-16

## 2018-08-16 RX ORDER — MAGNESIUM SULFATE 500 MG/ML
1 VIAL (ML) INJECTION ONCE
Qty: 0 | Refills: 0 | Status: COMPLETED | OUTPATIENT
Start: 2018-08-16 | End: 2018-08-16

## 2018-08-16 RX ORDER — OXYCODONE HYDROCHLORIDE 5 MG/1
10 TABLET ORAL EVERY 4 HOURS
Qty: 0 | Refills: 0 | Status: DISCONTINUED | OUTPATIENT
Start: 2018-08-16 | End: 2018-08-16

## 2018-08-16 RX ADMIN — Medication 25 MILLIGRAM(S): at 22:20

## 2018-08-16 RX ADMIN — Medication 100 MILLIGRAM(S): at 12:11

## 2018-08-16 RX ADMIN — CHLORHEXIDINE GLUCONATE 15 MILLILITER(S): 213 SOLUTION TOPICAL at 04:46

## 2018-08-16 RX ADMIN — HYDROMORPHONE HYDROCHLORIDE 0.5 MILLIGRAM(S): 2 INJECTION INTRAMUSCULAR; INTRAVENOUS; SUBCUTANEOUS at 01:18

## 2018-08-16 RX ADMIN — HYDROMORPHONE HYDROCHLORIDE 0.5 MILLIGRAM(S): 2 INJECTION INTRAMUSCULAR; INTRAVENOUS; SUBCUTANEOUS at 10:00

## 2018-08-16 RX ADMIN — Medication 100 MILLIGRAM(S): at 04:18

## 2018-08-16 RX ADMIN — Medication 100 MILLIGRAM(S): at 19:26

## 2018-08-16 RX ADMIN — HYDROMORPHONE HYDROCHLORIDE 2 MILLIGRAM(S): 2 INJECTION INTRAMUSCULAR; INTRAVENOUS; SUBCUTANEOUS at 14:30

## 2018-08-16 RX ADMIN — CHLORHEXIDINE GLUCONATE 1 APPLICATION(S): 213 SOLUTION TOPICAL at 12:11

## 2018-08-16 RX ADMIN — Medication 20 MILLIEQUIVALENT(S): at 07:24

## 2018-08-16 RX ADMIN — Medication 100 MILLIGRAM(S): at 12:33

## 2018-08-16 RX ADMIN — Medication 166.67 MILLIGRAM(S): at 22:20

## 2018-08-16 RX ADMIN — HYDROMORPHONE HYDROCHLORIDE 30 MILLILITER(S): 2 INJECTION INTRAMUSCULAR; INTRAVENOUS; SUBCUTANEOUS at 17:44

## 2018-08-16 RX ADMIN — PANTOPRAZOLE SODIUM 40 MILLIGRAM(S): 20 TABLET, DELAYED RELEASE ORAL at 12:32

## 2018-08-16 RX ADMIN — HYDROMORPHONE HYDROCHLORIDE 2 MILLIGRAM(S): 2 INJECTION INTRAMUSCULAR; INTRAVENOUS; SUBCUTANEOUS at 14:45

## 2018-08-16 RX ADMIN — HYDROMORPHONE HYDROCHLORIDE 1 MILLIGRAM(S): 2 INJECTION INTRAMUSCULAR; INTRAVENOUS; SUBCUTANEOUS at 04:39

## 2018-08-16 RX ADMIN — HYDROMORPHONE HYDROCHLORIDE 1 MILLIGRAM(S): 2 INJECTION INTRAMUSCULAR; INTRAVENOUS; SUBCUTANEOUS at 04:18

## 2018-08-16 RX ADMIN — HYDROMORPHONE HYDROCHLORIDE 0.5 MILLIGRAM(S): 2 INJECTION INTRAMUSCULAR; INTRAVENOUS; SUBCUTANEOUS at 09:17

## 2018-08-16 RX ADMIN — HEPARIN SODIUM 5000 UNIT(S): 5000 INJECTION INTRAVENOUS; SUBCUTANEOUS at 05:03

## 2018-08-16 RX ADMIN — Medication 100 MILLIGRAM(S): at 09:17

## 2018-08-16 RX ADMIN — OXYCODONE HYDROCHLORIDE 10 MILLIGRAM(S): 5 TABLET ORAL at 15:57

## 2018-08-16 RX ADMIN — Medication 100 MILLIGRAM(S): at 14:20

## 2018-08-16 RX ADMIN — HEPARIN SODIUM 5000 UNIT(S): 5000 INJECTION INTRAVENOUS; SUBCUTANEOUS at 14:30

## 2018-08-16 RX ADMIN — SODIUM CHLORIDE 100 MILLILITER(S): 9 INJECTION INTRAMUSCULAR; INTRAVENOUS; SUBCUTANEOUS at 01:18

## 2018-08-16 RX ADMIN — Medication 100 MILLIGRAM(S): at 01:13

## 2018-08-16 RX ADMIN — HYDROMORPHONE HYDROCHLORIDE 0.5 MILLIGRAM(S): 2 INJECTION INTRAMUSCULAR; INTRAVENOUS; SUBCUTANEOUS at 01:30

## 2018-08-16 RX ADMIN — Medication 100 GRAM(S): at 07:24

## 2018-08-16 RX ADMIN — Medication 250 MILLIGRAM(S): at 12:12

## 2018-08-16 RX ADMIN — HEPARIN SODIUM 5000 UNIT(S): 5000 INJECTION INTRAVENOUS; SUBCUTANEOUS at 22:20

## 2018-08-16 RX ADMIN — Medication 1 MILLIGRAM(S): at 12:12

## 2018-08-16 RX ADMIN — Medication 100 MILLIGRAM(S): at 18:24

## 2018-08-16 RX ADMIN — Medication 1 TABLET(S): at 12:32

## 2018-08-16 NOTE — PROGRESS NOTE ADULT - SUBJECTIVE AND OBJECTIVE BOX
Pain Management Consult Note - [facility] [phone]    Chief Complaint: right LE pain    HPI: Called to evaluate 28 y.o. male with hx of IVDA (heroine), methadone use, ETOH abuse. Admitted for right thigh gas gangreen. He is s/p debridement x 2, currently in the SICU.       Pain is _x__ sharp ____dull ___burning ___achy ___ Intensity: ____ mild _x__mod ___severe     Location __x__surgical site ____cervical _____lumbar ____abd ____upper ext__x__lower ext    Worse with __x__activity __x__movement _____physical therapy___ Rest    Improved with _x___medication _x___rest ____physical therapy    ROS: Const:  __-_febrile   Eyes:___ENT:___CV: _-__chest pain  Resp: __-__sob  GI:___nausea ___vomiting ___abd pain ___npo ___clears __full diet __bm  :___ [place]: _x__pain ___spasm  Skin:___ Neuro:  ___sedation___confusion___ numbness ___weakness ___paresth  Psych:__anxiety  Endo:___ Heme:___Allergy:_________, __x_all others reviewed and negative    PAST MEDICAL & SURGICAL HISTORY:  Heroin use  Schizophrenia  Alcohol abuse  No significant past surgical history    SH: __-_Tobacco   _-__Alcohol                          FH:FAMILY HISTORY: denies    aztreonam  IVPB 2000 milliGRAM(s) IV Intermittent <User Schedule>  chlorhexidine 0.12% Liquid 15 milliLiter(s) Swish and Spit two times a day  chlorhexidine 2% Cloths 1 Application(s) Topical daily  clindamycin IVPB      clindamycin IVPB 600 milliGRAM(s) IV Intermittent every 8 hours  folic acid 1 milliGRAM(s) Oral daily  haloperidol    Injectable 1 milliGRAM(s) IV Push every 6 hours PRN  heparin  Injectable 5000 Unit(s) SubCutaneous every 8 hours  HYDROmorphone  Injectable 0.5 milliGRAM(s) IV Push every 4 hours PRN  insulin lispro (HumaLOG) corrective regimen sliding scale   SubCutaneous every 6 hours  LORazepam   Injectable 2 milliGRAM(s) IV Push every 2 hours PRN  multivitamin 1 Tablet(s) Oral daily  oxyCODONE    IR 5 milliGRAM(s) Oral every 4 hours PRN  oxyCODONE    IR 10 milliGRAM(s) Oral every 4 hours PRN  pantoprazole  Injectable 40 milliGRAM(s) IV Push daily  sodium chloride 0.9%. 1000 milliLiter(s) IV Continuous <Continuous>  thiamine 100 milliGRAM(s) Oral daily  vancomycin  IVPB 1250 milliGRAM(s) IV Intermittent every 12 hours      T(C): 35.7 (08-16-18 @ 14:04), Max: 38.6 (08-15-18 @ 20:00)  HR: 72 (08-16-18 @ 15:00) (70 - 104)  BP: 90/57 (08-16-18 @ 13:00) (90/57 - 118/68)  RR: 24 (08-16-18 @ 15:00) (9 - 29)  SpO2: 97% (08-16-18 @ 12:00) (93% - 100%)  Wt(kg): --    T(C): 35.7 (08-16-18 @ 14:04), Max: 38.6 (08-15-18 @ 20:00)  HR: 72 (08-16-18 @ 15:00) (70 - 104)  BP: 90/57 (08-16-18 @ 13:00) (90/57 - 118/68)  RR: 24 (08-16-18 @ 15:00) (9 - 29)  SpO2: 97% (08-16-18 @ 12:00) (93% - 100%)  Wt(kg): --    T(C): 35.7 (08-16-18 @ 14:04), Max: 38.6 (08-15-18 @ 20:00)  HR: 72 (08-16-18 @ 15:00) (70 - 104)  BP: 90/57 (08-16-18 @ 13:00) (90/57 - 118/68)  RR: 24 (08-16-18 @ 15:00) (9 - 29)  SpO2: 97% (08-16-18 @ 12:00) (93% - 100%)  Wt(kg): --    PHYSICAL EXAM:  Gen Appearance: __x_no acute distress _x__appropriate        Neuro: _x__SILT feet____ EOM Intact Psych: AAOX_3_, __x_mood/affect appropriate        Eyes: __x_conjunctiva WNL  _____ Pupils equal and round        ENT: _x__ears and nose atraumatic _x__ Hearing grossly intact        Neck: __x_trachea midline, no visible masses ___thyroid without palpable mass    Resp: _x__Nml WOB____No tactile fremitus ___clear to auscultation    Cardio: __x_extremities free from edema ____pedal pulses palpable    GI/Abdomen: __x_soft ___x__ Nontender___x___Nondistended_____HSM    Lymphatic: _x__no palpable nodes in neck  ___no palpable nodes calves and feet    Skin/Wound: ___Incision, __x_Dressing c/d/i,   ____surrounding tissues soft,  ___drain/chest tube present____    Muscular: EHL _5__/5  Gastrocnemius__5_/5    __x_absent clubbing/cyanosis      ASSESSMENT: This is a 28y old Male with a history of   ANKLE PAIN  Handoff  Heroin use  Schizophrenia  Alcohol abuse  No pertinent past medical history  Heroin use  Necrotizing fasciitis  Delirium due to another medical condition  Vascular surgery procedure  Vascular surgical procedure involving right lower extremity  No significant past surgical history  ANKLE PAIN  90+      Recommended Treatment PLAN:    1. Recommend Dilaudid PCA 0.4mg q6 minutes with Dilaudid 1mg q1 hr bolus.  2. Recommend Lyrica 25mg PO TID  Plan Discussed with Dr. Baker

## 2018-08-16 NOTE — CHART NOTE - NSCHARTNOTEFT_GEN_A_CORE
Admitting Diagnosis:   Patient is a 28y old  Male who presents with a chief complaint of Necrotizing fasciitis (13 Aug 2018 01:21)      PAST MEDICAL & SURGICAL HISTORY:  Heroin use  Schizophrenia  Alcohol abuse  No significant past surgical history      Current Nutrition Order: Regular diet    PO Intake: Good (%) [ X ]  Fair (50-75%) [   ] Poor (<25%) [   ] per breakfast meal    GI Issues: Denies N/V/D/C    Pain: controlled per pt    Skin Integrity: intact with surgical incision (R thigh necrotizing fascitis) per RN flow sheet     Labs:       135  |  100  |  8   ----------------------------<  100<H>  3.8   |  27  |  0.70    Ca    7.8<L>      16 Aug 2018 05:10  Phos  2.7       Mg     1.9           CAPILLARY BLOOD GLUCOSE      POCT Blood Glucose.: 110 mg/dL (16 Aug 2018 04:26)  POCT Blood Glucose.: 113 mg/dL (15 Aug 2018 21:33)  POCT Blood Glucose.: 77 mg/dL (15 Aug 2018 17:35)  POCT Blood Glucose.: 127 mg/dL (15 Aug 2018 11:33)      Medications:  MEDICATIONS  (STANDING):  aztreonam  IVPB 2000 milliGRAM(s) IV Intermittent <User Schedule>  chlorhexidine 0.12% Liquid 15 milliLiter(s) Swish and Spit two times a day  chlorhexidine 2% Cloths 1 Application(s) Topical daily  clindamycin IVPB      clindamycin IVPB 600 milliGRAM(s) IV Intermittent every 8 hours  folic acid Injectable 1 milliGRAM(s) IV Push daily  heparin  Injectable 5000 Unit(s) SubCutaneous every 8 hours  insulin lispro (HumaLOG) corrective regimen sliding scale   SubCutaneous every 6 hours  multivitamin 1 Tablet(s) Oral daily  pantoprazole  Injectable 40 milliGRAM(s) IV Push daily  sodium chloride 0.9%. 1000 milliLiter(s) (100 mL/Hr) IV Continuous <Continuous>  thiamine Injectable 100 milliGRAM(s) IV Push daily  vancomycin  IVPB 1000 milliGRAM(s) IV Intermittent every 12 hours    MEDICATIONS  (PRN):  haloperidol    Injectable 1 milliGRAM(s) IV Push every 6 hours PRN Agitation  HYDROmorphone  Injectable 1 milliGRAM(s) IV Push every 4 hours PRN Severe Pain (7 - 10)  HYDROmorphone  Injectable 0.5 milliGRAM(s) IV Push every 4 hours PRN Moderate Pain (4 - 6)  LORazepam   Injectable 2 milliGRAM(s) IV Push every 2 hours PRN Agitation      Weight: No new weights obtained   63.5 kg ()    Weight Change: No new weights obtained     Estimated energy needs:   Ht 172.72cm; Wt 63.5Kg IBW 70Kg; %IBW 91% BMI 21.3  Utilized IBW to calculate needs 2/2 vent/post-op/pre-op/wound healing  25-30 kcal/[id number] kcal  1.4-1.6 gm/k-112 gm protein  25-30 mL/[id number] mL fluids    Subjective:   27y/o M h/o IVDU (meth, heroin), ETOH abuse, schizophrenia. NKFA. s/p washout and debridement . s/p intubation and extubation, off sedation, off pressors. EN support discontinued. Seen pt at bedside during breakfast meal, more awake, alert and oriented to verbal stimulation, pt tolerating PO diet well with good appetite. Denies N/V/D/C, pain well controlled.     Previous Nutrition Diagnosis:  Inadequate energy intake RT NPO status AEB pt meeting 0% of EER    Active [   ]  Resolved [ X ], PO diet started    If resolved, new PES: none identified     Goal:  Maintain >75% PO intake  No s/s of any GI intolerances  Promote wound healing    Recommendations:  1) Continue regular diet  2) C/w MVI, Folic acid and Thiamine supplementation    Education: Encourage PO intake    Risk Level: High [   ] Moderate [ X ] Low [   ]

## 2018-08-16 NOTE — PROGRESS NOTE ADULT - SUBJECTIVE AND OBJECTIVE BOX
INTERVAL HPI/OVERNIGHT EVENTS:    PRESSORS: [ ] YES [ ] NO  WHICH:  DOSE:    ANTIBIOTICS:                  DATE STARTED:  ANTIBIOTICS:                  DATE STARTED:  ANTIBIOTICS:                  DATE STARTED:    MEDICATIONS  (STANDING):  aztreonam  IVPB 2000 milliGRAM(s) IV Intermittent <User Schedule>  chlorhexidine 0.12% Liquid 15 milliLiter(s) Swish and Spit two times a day  chlorhexidine 2% Cloths 1 Application(s) Topical daily  clindamycin IVPB      clindamycin IVPB 600 milliGRAM(s) IV Intermittent every 8 hours  folic acid Injectable 1 milliGRAM(s) IV Push daily  heparin  Injectable 5000 Unit(s) SubCutaneous every 8 hours  insulin lispro (HumaLOG) corrective regimen sliding scale   SubCutaneous every 6 hours  multivitamin 1 Tablet(s) Oral daily  pantoprazole  Injectable 40 milliGRAM(s) IV Push daily  sodium chloride 0.9%. 1000 milliLiter(s) (100 mL/Hr) IV Continuous <Continuous>  thiamine Injectable 100 milliGRAM(s) IV Push daily  vancomycin  IVPB 1000 milliGRAM(s) IV Intermittent every 12 hours    MEDICATIONS  (PRN):  haloperidol    Injectable 1 milliGRAM(s) IV Push every 6 hours PRN Agitation  HYDROmorphone  Injectable 1 milliGRAM(s) IV Push every 4 hours PRN Severe Pain (7 - 10)  HYDROmorphone  Injectable 0.5 milliGRAM(s) IV Push every 4 hours PRN Moderate Pain (4 - 6)  LORazepam   Injectable 2 milliGRAM(s) IV Push every 2 hours PRN Agitation      Drug Dosing Weight  Height (cm): 172.72 (14 Aug 2018 11:27)  Weight (kg): 63.5 (14 Aug 2018 11:27)  BMI (kg/m2): 21.3 (14 Aug 2018 11:27)  BSA (m2): 1.76 (14 Aug 2018 11:27)    CENTRAL LINE: [ ] YES [ ] NO  LOCATION:   DATE INSERTED:  REMOVE: [ ] YES [ ] NO  EXPLAIN:    BAILON: [ ] YES [ ] NO    DATE INSERTED:  REMOVE: [ ] YES [ ] NO  EXPLAIN:    A-LINE: [ ] YES [ ] NO  LOCATION:   DATE INSERTED:  REMOVE: [ ] YES [ ] NO  EXPLAIN:    PAST MEDICAL & SURGICAL HISTORY:  Heroin use  Schizophrenia  Alcohol abuse  No significant past surgical history      REVIEW OF SYSTEMS      General:	    Skin/Breast:  	  Ophthalmologic:  	  ENMT:	    Respiratory and Thorax:  	  Cardiovascular:	    Gastrointestinal:	    Genitourinary:	    Musculoskeletal:	    Neurological:	    Psychiatric:	    Hematology/Lymphatics:	    Endocrine:	    Allergic/Immunologic:	    ICU Vital Signs Last 24 Hrs  T(C): 37 (16 Aug 2018 01:25), Max: 38.6 (15 Aug 2018 20:00)  T(F): 98.6 (16 Aug 2018 01:25), Max: 101.4 (15 Aug 2018 20:00)  HR: 84 (16 Aug 2018 07:00) (56 - 104)  BP: 105/67 (16 Aug 2018 07:00) (93/54 - 116/60)  BP(mean): 78 (16 Aug 2018 07:00) (67 - 80)  ABP: 118/46 (15 Aug 2018 21:00) (108/48 - 152/64)  ABP(mean): 64 (15 Aug 2018 21:00) (64 - 84)  RR: 19 (16 Aug 2018 07:00) (9 - 31)  SpO2: 95% (16 Aug 2018 07:00) (71% - 100%)      ABG - ( 14 Aug 2018 17:10 )  pH, Arterial: 7.46  pH, Blood: x     /  pCO2: 35    /  pO2: 155   / HCO3: 25    / Base Excess: 1.1   /  SaO2: 99                  I&O's Detail    15 Aug 2018 07:01  -  16 Aug 2018 07:00  --------------------------------------------------------  IN:    fentaNYL  Infusion: 8 mL    IV PiggyBack: 550 mL    midazolam Infusion: 7 mL    norepinephrine Infusion: 5 mL    sodium chloride 0.9%.: 1700 mL  Total IN: 2270 mL    OUT:    Indwelling Catheter - Urethral: 2165 mL  Total OUT: 2165 mL    Total NET: 105 mL      16 Aug 2018 07:01  -  16 Aug 2018 07:37  --------------------------------------------------------  IN:    sodium chloride 0.9%.: 100 mL  Total IN: 100 mL    OUT:  Total OUT: 0 mL    Total NET: 100 mL          Mode: AC/ CMV (Assist Control/ Continuous Mandatory Ventilation)  RR (machine): 12  TV (machine): 500  FiO2: 40  PEEP: 5  ITime: 1  MAP: 7.9  PIP: 20      Physical exam:      LABS:  CBC Full  -  ( 16 Aug 2018 05:10 )  WBC Count : 12.9 K/uL  Hemoglobin : 9.1 g/dL  Hematocrit : 27.7 %  Platelet Count - Automated : 386 K/uL  Mean Cell Volume : 93.0 fL  Mean Cell Hemoglobin : 30.5 pg  Mean Cell Hemoglobin Concentration : 32.9 g/dL  Auto Neutrophil # : x  Auto Lymphocyte # : x  Auto Monocyte # : x  Auto Eosinophil # : x  Auto Basophil # : x  Auto Neutrophil % : x  Auto Lymphocyte % : x  Auto Monocyte % : x  Auto Eosinophil % : x  Auto Basophil % : x    08-16    135  |  100  |  8   ----------------------------<  100<H>  3.8   |  27  |  0.70    Ca    7.8<L>      16 Aug 2018 05:10  Phos  2.7     08-16  Mg     1.9     08-16            RADIOLOGY & ADDITIONAL STUDIES:    CRITICAL CARE TIME SPENT: INTERVAL HPI/OVERNIGHT EVENTS:    8/15:started precedex and ativan 2q2 prn weaned off versed and fentanyl and extubated, regular diet  o/n: added haldol prn per psyc recs. Barrett removed by patient.     Pt seen and examined at bedside. Complaining of pain to leg. Denies CP/ SOB, N/V, F/C   PRESSORS: [ ] YES [x ] NO  WHICH:  DOSE:    ANTIBIOTICS:     aztreonam               DATE STARTED:  8/13  ANTIBIOTICS:     clindamycin              DATE STARTED:  8/13  ANTIBIOTICS:     Vancomycin             DATE STARTED:   8/13    MEDICATIONS  (STANDING):  aztreonam  IVPB 2000 milliGRAM(s) IV Intermittent <User Schedule>  chlorhexidine 0.12% Liquid 15 milliLiter(s) Swish and Spit two times a day  chlorhexidine 2% Cloths 1 Application(s) Topical daily  clindamycin IVPB      clindamycin IVPB 600 milliGRAM(s) IV Intermittent every 8 hours  folic acid Injectable 1 milliGRAM(s) IV Push daily  heparin  Injectable 5000 Unit(s) SubCutaneous every 8 hours  insulin lispro (HumaLOG) corrective regimen sliding scale   SubCutaneous every 6 hours  multivitamin 1 Tablet(s) Oral daily  pantoprazole  Injectable 40 milliGRAM(s) IV Push daily  sodium chloride 0.9%. 1000 milliLiter(s) (100 mL/Hr) IV Continuous <Continuous>  thiamine Injectable 100 milliGRAM(s) IV Push daily  vancomycin  IVPB 1000 milliGRAM(s) IV Intermittent every 12 hours    MEDICATIONS  (PRN):  haloperidol    Injectable 1 milliGRAM(s) IV Push every 6 hours PRN Agitation  HYDROmorphone  Injectable 1 milliGRAM(s) IV Push every 4 hours PRN Severe Pain (7 - 10)  HYDROmorphone  Injectable 0.5 milliGRAM(s) IV Push every 4 hours PRN Moderate Pain (4 - 6)  LORazepam   Injectable 2 milliGRAM(s) IV Push every 2 hours PRN Agitation      Drug Dosing Weight  Height (cm): 172.72 (14 Aug 2018 11:27)  Weight (kg): 63.5 (14 Aug 2018 11:27)  BMI (kg/m2): 21.3 (14 Aug 2018 11:27)  BSA (m2): 1.76 (14 Aug 2018 11:27)    CENTRAL LINE: [ ] YES [ ] NO  LOCATION:   DATE INSERTED:  REMOVE: [ ] YES [ ] NO  EXPLAIN:    BAILON: [ ] YES [ ] NO    DATE INSERTED:  REMOVE: [ ] YES [ ] NO  EXPLAIN:    A-LINE: [ ] YES [ ] NO  LOCATION:   DATE INSERTED:  REMOVE: [ ] YES [ ] NO  EXPLAIN:    PAST MEDICAL & SURGICAL HISTORY:  Heroin use  Schizophrenia  Alcohol abuse  No significant past surgical history      REVIEW OF SYSTEMS      General:	    Skin/Breast:  	  Ophthalmologic:  	  ENMT:	    Respiratory and Thorax:  	  Cardiovascular:	    Gastrointestinal:	    Genitourinary:	    Musculoskeletal:	    Neurological:	    Psychiatric:	    Hematology/Lymphatics:	    Endocrine:	    Allergic/Immunologic:	    ICU Vital Signs Last 24 Hrs  T(C): 37 (16 Aug 2018 01:25), Max: 38.6 (15 Aug 2018 20:00)  T(F): 98.6 (16 Aug 2018 01:25), Max: 101.4 (15 Aug 2018 20:00)  HR: 84 (16 Aug 2018 07:00) (56 - 104)  BP: 105/67 (16 Aug 2018 07:00) (93/54 - 116/60)  BP(mean): 78 (16 Aug 2018 07:00) (67 - 80)  ABP: 118/46 (15 Aug 2018 21:00) (108/48 - 152/64)  ABP(mean): 64 (15 Aug 2018 21:00) (64 - 84)  RR: 19 (16 Aug 2018 07:00) (9 - 31)  SpO2: 95% (16 Aug 2018 07:00) (71% - 100%)      ABG - ( 14 Aug 2018 17:10 )  pH, Arterial: 7.46  pH, Blood: x     /  pCO2: 35    /  pO2: 155   / HCO3: 25    / Base Excess: 1.1   /  SaO2: 99                  I&O's Detail    15 Aug 2018 07:01  -  16 Aug 2018 07:00  --------------------------------------------------------  IN:    fentaNYL  Infusion: 8 mL    IV PiggyBack: 550 mL    midazolam Infusion: 7 mL    norepinephrine Infusion: 5 mL    sodium chloride 0.9%.: 1700 mL  Total IN: 2270 mL    OUT:    Indwelling Catheter - Urethral: 2165 mL  Total OUT: 2165 mL    Total NET: 105 mL      16 Aug 2018 07:01  -  16 Aug 2018 07:37  --------------------------------------------------------  IN:    sodium chloride 0.9%.: 100 mL  Total IN: 100 mL    OUT:  Total OUT: 0 mL    Total NET: 100 mL          Mode: AC/ CMV (Assist Control/ Continuous Mandatory Ventilation)  RR (machine): 12  TV (machine): 500  FiO2: 40  PEEP: 5  ITime: 1  MAP: 7.9  PIP: 20      Physical exam:  Neurological: Alert and oriented  HEENT: NT NC trachea midline. No JVD  Cardiovascular: RRR, no MRG  Respiratory: CTABL   Gastrointestinal: soft, NT, ND, BS+  Extremities: warm, mild edema in all extremities, iodoform dressing in place R thigh with s/s to ALEKSANDAR x3, dressings on leg intact   Vascular: no cyanosis/erythema  SKIN: multiple areas of excoration in both arms and legs. right antecub area with small area of eschar.       LABS:  CBC Full  -  ( 16 Aug 2018 05:10 )  WBC Count : 12.9 K/uL  Hemoglobin : 9.1 g/dL  Hematocrit : 27.7 %  Platelet Count - Automated : 386 K/uL  Mean Cell Volume : 93.0 fL  Mean Cell Hemoglobin : 30.5 pg  Mean Cell Hemoglobin Concentration : 32.9 g/dL  Auto Neutrophil # : x  Auto Lymphocyte # : x  Auto Monocyte # : x  Auto Eosinophil # : x  Auto Basophil # : x  Auto Neutrophil % : x  Auto Lymphocyte % : x  Auto Monocyte % : x  Auto Eosinophil % : x  Auto Basophil % : x    08-16    135  |  100  |  8   ----------------------------<  100<H>  3.8   |  27  |  0.70    Ca    7.8<L>      16 Aug 2018 05:10  Phos  2.7     08-16  Mg     1.9     08-16            RADIOLOGY & ADDITIONAL STUDIES:    CRITICAL CARE TIME SPENT:

## 2018-08-16 NOTE — PROGRESS NOTE ADULT - ATTENDING COMMENTS
Patient seen and examined with house-staff during bedside rounds  Resident note read, including vitals, physical findings, laboratory data, and radiological reports.   Revisions included below.  Case discussed with House staff  Direct personal management at bedside  and extensive interpretation of data. Decision making of high complexity.
Patient seen and examined with house-staff during bedside rounds  Resident note read, including vitals, physical findings, laboratory data, and radiological reports.   Revisions included below.  Case discussed with House staff  Direct personal management at bedside  and extensive interpretation of data. Decision making of high complexity.
Patient seen and examined with house-staff during bedside rounds  Resident note read, including vitals, physical findings, laboratory data, and radiological reports.   Revisions included below.  Case discussed with House staff  Direct personal management at bedside  and extensive interpretation of data. Decision making of high complexity.    Extubated. Question whether he is alert understands his present problems
Patient seen and examined with house-staff during bedside rounds.  Resident note read, including vitals, physical findings, laboratory data, and radiological reports.   Revisions included below.  Direct personal management at bed side and extensive interpretation of the data.  Plan was outlined and discussed in details with the housestaff.  Decision making of high complexity  Action taken for acute disease activity to reflect the level of care provided:  - medication reconciliation  - review laboratory data  Respiratory failure septic shock gas gangrene. Keeping was inserted. Patient had to be decontaminated for lice. Patient is for the OR for further debridement if required. Continue antibiotic. For all cultures. Discussed with vascular. Patient is being weaned off pressers. Patient is requiring sedative and pain management

## 2018-08-16 NOTE — BEHAVIORAL HEALTH ASSESSMENT NOTE - NSBHADMITCOUNSEL_PSY_A_CORE
instructions for management, treatment and follow up/risk factor reduction/diagnostic results/impressions and/or recommended studies/risks and benefits of treatment options

## 2018-08-16 NOTE — PROGRESS NOTE ADULT - ASSESSMENT
29 yo M h/o IVDU (meth, heroin) Etoh abuse, schizophrenia p/w RLE pain and erythema found to have gas gangrene s/p dedridement x2     Neuro: dilaudid prn, ativan 2q2 prn, haldol q6hrs. psyc following  CV: HD stable. Echo ef 55-60% (8/14)  Pulm: RA  GI/FEN: Regular diet, protonix, ETOH abuse- Thiamine and folic acid  : Higuera  ID: GNR/GPC: R leg gangrene: Aztreonam (8/13-), Vanco (8/13-), Clinda (8/13-)  Endo: ISS  Heme: HSQ  PPX: SCD  Lines: PIV, L TLC (8/13--) /// dc [place]( 8/14-16)   Wounds: RLE dsg, Thigh drains x3 to wall suction.   PT/OT: not ordered

## 2018-08-16 NOTE — BEHAVIORAL HEALTH ASSESSMENT NOTE - HPI (INCLUDE ILLNESS QUALITY, SEVERITY, DURATION, TIMING, CONTEXT, MODIFYING FACTORS, ASSOCIATED SIGNS AND SYMPTOMS)
Mr. Ireland is a 28 year old white male, undomiciled, single, reporting no family or friends, history of extensive IVDA (heroin), benzodiazepines (xanax), alcohol abuse and use of "street drugs" was brought in by ambulance (transferred from [facility] ED) after being found on the street with fever, severe pain in right thigh, now found to have necrotizing infection, sepsis, gonorrhea, lice, and overall debilitation. Subsequent to debridement for gas gangrene (at IVDA injection site) on 8/12 and 8/14 and at risk for amputation of limb, as well as sepsis, patient has been in ICU for medical stabilization and intensive treatment. Today, seen at bedside, Mr. Ireland has some minimal ability to engage: he is vague regarding his history other than stating he is originally from [place], [place] and has been living on the streets for 13 years. He denies having friends or family and is unable to describe shelters or other places he may have lived over the years. He reports that he has no psychiatric diagnoses, but had said in ER's that he was schizophrenic "1 or 2 times" in order to obtain shelter which he regretted when he realized that "you get locked in" when admitted to inpatient psychiatric units. He denies having outpatient treatment for psychiatric complaints at any time. He reports pain but was unable to describe where pain exists in his body or what might have caused his medical state. He is vague regarding his history of substance abuse. He does not respond to questions regarding his safety or suicidality nor homicidality. He is unable to state the present president's name, able to identify the prior president with some support, had difficulty repeating 3 words and could recall only one word after one minute, again with much support and prompting. He was not alert to person, or place but was able to look at the wall to see the date written for him on a white board and read it partially. Mr. Ireland was very focused on food and his meals: due to his lunch being served was unable to sustain too much further discussion. He stated when asked regarding his treatment and agreeing to recommended procedures and care: "Is there any reason not to let the doctors take care of me?" When asked if he would consider rehab his response was "they will feed me there right?" At this time, Mr. Ireland has limited capacity to refuse care and appropriate medical treatment. Due to his underlying delirium subsequent to underlying infection resulting in sepsis, his debilitated medical condition as well as requiring pain management due to recent debridement procedure, Mr. Ireland demonstrates limited capacity on interview to refuse appropriate medical and surgical care.

## 2018-08-16 NOTE — BEHAVIORAL HEALTH ASSESSMENT NOTE - NSBHSUICRISKFACTOR_PSY_A_CORE
Substance abuse/dependence/Chronic pain or acute medical issue/Unable to engage in safety planning/History of abuse/trauma/Hopelessness

## 2018-08-16 NOTE — CHART NOTE - NSCHARTNOTEFT_GEN_A_CORE
Infectious Diseases Anti-infective Approval Note    Medication:  Aztreonam  Dose:  2 grams  Route:  IV   Frequency:  q8hrs  Duration:  1 day    Dose may be adjusted as needed for alterations in renal function.    *THIS IS NOT AN INFECTIOUS DISEASES CONSULTATION*

## 2018-08-17 DIAGNOSIS — F19.10 OTHER PSYCHOACTIVE SUBSTANCE ABUSE, UNCOMPLICATED: ICD-10-CM

## 2018-08-17 LAB
ANION GAP SERPL CALC-SCNC: 7 MMOL/L — SIGNIFICANT CHANGE UP (ref 5–17)
BUN SERPL-MCNC: 6 MG/DL — LOW (ref 7–23)
CALCIUM SERPL-MCNC: 8 MG/DL — LOW (ref 8.4–10.5)
CHLORIDE SERPL-SCNC: 98 MMOL/L — SIGNIFICANT CHANGE UP (ref 96–108)
CO2 SERPL-SCNC: 28 MMOL/L — SIGNIFICANT CHANGE UP (ref 22–31)
CREAT SERPL-MCNC: 0.63 MG/DL — SIGNIFICANT CHANGE UP (ref 0.5–1.3)
GLUCOSE BLDC GLUCOMTR-MCNC: 91 MG/DL — SIGNIFICANT CHANGE UP (ref 70–99)
GLUCOSE SERPL-MCNC: 107 MG/DL — HIGH (ref 70–99)
HCT VFR BLD CALC: 29.7 % — LOW (ref 39–50)
HGB BLD-MCNC: 9.6 G/DL — LOW (ref 13–17)
MAGNESIUM SERPL-MCNC: 2.2 MG/DL — SIGNIFICANT CHANGE UP (ref 1.6–2.6)
MCHC RBC-ENTMCNC: 30.3 PG — SIGNIFICANT CHANGE UP (ref 27–34)
MCHC RBC-ENTMCNC: 32.3 G/DL — SIGNIFICANT CHANGE UP (ref 32–36)
MCV RBC AUTO: 93.7 FL — SIGNIFICANT CHANGE UP (ref 80–100)
PHOSPHATE SERPL-MCNC: 3.2 MG/DL — SIGNIFICANT CHANGE UP (ref 2.5–4.5)
PLATELET # BLD AUTO: 456 K/UL — HIGH (ref 150–400)
POTASSIUM SERPL-MCNC: 4.1 MMOL/L — SIGNIFICANT CHANGE UP (ref 3.5–5.3)
POTASSIUM SERPL-SCNC: 4.1 MMOL/L — SIGNIFICANT CHANGE UP (ref 3.5–5.3)
RBC # BLD: 3.17 M/UL — LOW (ref 4.2–5.8)
RBC # FLD: 13.6 % — SIGNIFICANT CHANGE UP (ref 10.3–16.9)
SODIUM SERPL-SCNC: 133 MMOL/L — LOW (ref 135–145)
WBC # BLD: 8.8 K/UL — SIGNIFICANT CHANGE UP (ref 3.8–10.5)
WBC # FLD AUTO: 8.8 K/UL — SIGNIFICANT CHANGE UP (ref 3.8–10.5)

## 2018-08-17 PROCEDURE — 99233 SBSQ HOSP IP/OBS HIGH 50: CPT

## 2018-08-17 RX ORDER — CEFTRIAXONE 500 MG/1
2 INJECTION, POWDER, FOR SOLUTION INTRAMUSCULAR; INTRAVENOUS ONCE
Qty: 0 | Refills: 0 | Status: COMPLETED | OUTPATIENT
Start: 2018-08-17 | End: 2018-08-17

## 2018-08-17 RX ORDER — METRONIDAZOLE 500 MG
500 TABLET ORAL ONCE
Qty: 0 | Refills: 0 | Status: COMPLETED | OUTPATIENT
Start: 2018-08-17 | End: 2018-08-17

## 2018-08-17 RX ORDER — CEFTRIAXONE 500 MG/1
INJECTION, POWDER, FOR SOLUTION INTRAMUSCULAR; INTRAVENOUS
Qty: 0 | Refills: 0 | Status: DISCONTINUED | OUTPATIENT
Start: 2018-08-17 | End: 2018-08-22

## 2018-08-17 RX ORDER — METRONIDAZOLE 500 MG
TABLET ORAL
Qty: 0 | Refills: 0 | Status: DISCONTINUED | OUTPATIENT
Start: 2018-08-17 | End: 2018-08-22

## 2018-08-17 RX ORDER — METRONIDAZOLE 500 MG
500 TABLET ORAL EVERY 8 HOURS
Qty: 0 | Refills: 0 | Status: DISCONTINUED | OUTPATIENT
Start: 2018-08-17 | End: 2018-08-22

## 2018-08-17 RX ORDER — CEFTRIAXONE 500 MG/1
2 INJECTION, POWDER, FOR SOLUTION INTRAMUSCULAR; INTRAVENOUS EVERY 24 HOURS
Qty: 0 | Refills: 0 | Status: DISCONTINUED | OUTPATIENT
Start: 2018-08-18 | End: 2018-08-22

## 2018-08-17 RX ADMIN — Medication 25 MILLIGRAM(S): at 06:39

## 2018-08-17 RX ADMIN — Medication 1 TABLET(S): at 11:30

## 2018-08-17 RX ADMIN — HYDROMORPHONE HYDROCHLORIDE 30 MILLILITER(S): 2 INJECTION INTRAMUSCULAR; INTRAVENOUS; SUBCUTANEOUS at 22:21

## 2018-08-17 RX ADMIN — Medication 100 MILLIGRAM(S): at 16:18

## 2018-08-17 RX ADMIN — HEPARIN SODIUM 5000 UNIT(S): 5000 INJECTION INTRAVENOUS; SUBCUTANEOUS at 06:39

## 2018-08-17 RX ADMIN — Medication 100 MILLIGRAM(S): at 02:21

## 2018-08-17 RX ADMIN — Medication 25 MILLIGRAM(S): at 13:43

## 2018-08-17 RX ADMIN — Medication 1 MILLIGRAM(S): at 11:30

## 2018-08-17 RX ADMIN — PANTOPRAZOLE SODIUM 40 MILLIGRAM(S): 20 TABLET, DELAYED RELEASE ORAL at 11:30

## 2018-08-17 RX ADMIN — CHLORHEXIDINE GLUCONATE 1 APPLICATION(S): 213 SOLUTION TOPICAL at 11:31

## 2018-08-17 RX ADMIN — Medication 100 MILLIGRAM(S): at 04:27

## 2018-08-17 RX ADMIN — CEFTRIAXONE 100 GRAM(S): 500 INJECTION, POWDER, FOR SOLUTION INTRAMUSCULAR; INTRAVENOUS at 16:18

## 2018-08-17 RX ADMIN — Medication 25 MILLIGRAM(S): at 21:58

## 2018-08-17 RX ADMIN — HEPARIN SODIUM 5000 UNIT(S): 5000 INJECTION INTRAVENOUS; SUBCUTANEOUS at 13:43

## 2018-08-17 RX ADMIN — Medication 100 MILLIGRAM(S): at 21:58

## 2018-08-17 RX ADMIN — HEPARIN SODIUM 5000 UNIT(S): 5000 INJECTION INTRAVENOUS; SUBCUTANEOUS at 21:58

## 2018-08-17 RX ADMIN — Medication 100 MILLIGRAM(S): at 10:19

## 2018-08-17 RX ADMIN — Medication 100 MILLIGRAM(S): at 11:30

## 2018-08-17 NOTE — PROGRESS NOTE BEHAVIORAL HEALTH - PROBLEM SELECTOR PLAN 2
Continue motivational interview to assess pt's intent for treatment. Avoid Ativan prn unless pt is showing signs of benzo withdrawal.   May use Neurontin 300 mg tid prn anxiety which could also be beneficial with pain   Continue motivational interview to assess pt's intent for treatment.

## 2018-08-17 NOTE — PROGRESS NOTE ADULT - SUBJECTIVE AND OBJECTIVE BOX
S: No new issues/events overnight, no new med c/o    O: ICU Vital Signs Last 24 Hrs  T(F): 97.8 (08-17-18 @ 10:00), Max: 98.2 (08-16-18 @ 17:28)  HR: 64 (08-17-18 @ 12:00) (52 - 78)  BP: 113/67 (08-17-18 @ 12:00) (90/57 - 137/65)  BP(mean): 79 (08-17-18 @ 12:00) (69 - 100)  ABP: --  RR: 10 (08-17-18 @ 12:00) (10 - 34)  SpO2: 98% (08-17-18 @ 10:00) (94% - 98%)    PHYSICAL EXAM:     Neurological: AAOx3, CNII-XII intact,  strength 5/5 b/l  Cardiovascular: RRR  Respiratory: CTA  Gastrointestinal: soft, NT, ND, BS+  Extremities: warm, no dependent edema, dressing to right thigh wounds.   Vascular: no cyanosis/erythema    LABS:    08-17    133<L>  |  98  |  6<L>  ----------------------------<  107<H>  4.1   |  28  |  0.63    Ca    8.0<L>      17 Aug 2018 07:08  Phos  3.2     08-17  Mg     2.2     08-17                          9.6    8.8   )-----------( 456      ( 17 Aug 2018 07:08 )             29.7     CAPILLARY BLOOD GLUCOSE      POCT Blood Glucose.: 91 mg/dL (17 Aug 2018 11:42)  POCT Blood Glucose.: 97 mg/dL (16 Aug 2018 22:32)  POCT Blood Glucose.: 93 mg/dL (16 Aug 2018 17:59)  POCT Blood Glucose.: 98 mg/dL (16 Aug 2018 15:45)    MEDICATIONS  (STANDING):  chlorhexidine 2% Cloths 1 Application(s) Topical daily  clindamycin IVPB      clindamycin IVPB 600 milliGRAM(s) IV Intermittent every 8 hours  folic acid 1 milliGRAM(s) Oral daily  heparin  Injectable 5000 Unit(s) SubCutaneous every 8 hours  HYDROmorphone PCA (1 mG/mL) 30 milliLiter(s) PCA Continuous PCA Continuous  insulin lispro (HumaLOG) corrective regimen sliding scale   SubCutaneous every 6 hours  multivitamin 1 Tablet(s) Oral daily  pantoprazole  Injectable 40 milliGRAM(s) IV Push daily  pregabalin 25 milliGRAM(s) Oral three times a day  thiamine 100 milliGRAM(s) Oral daily    MEDICATIONS  (PRN):  haloperidol    Injectable 1 milliGRAM(s) IV Push every 6 hours PRN Agitation  HYDROmorphone  Injectable 1 milliGRAM(s) IV Push every 1 hour PRN breakthrough pain  LORazepam   Injectable 2 milliGRAM(s) IV Push every 2 hours PRN Agitation      Higuera:	  [ ] None	[ x] Daily Higuera Order Placed	   Indication:	  [ x] Strict I and O's    [ ] Obstruction     [ ] Incontinence + Stage 3 or 4 Decubitus  Central Line:  [ ] None	   [x ]  Medication / TPN Administration     [ ] No Peripheral IV

## 2018-08-17 NOTE — PROGRESS NOTE ADULT - ASSESSMENT
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 s/p dedridement x2     Neuro: dilaudid PCA 1Qhr prn pain,  ativan 2q2 prn agitation/withdrawal symptoms, haldol q6hrs. psych following  CV: HD stable. Echo ef 55-60% (8/14)  Pulm: RA  GI/FEN: Regular diet, protonix, hx ETOH abuse- Thiamine and folic acid  : dc [place]  ID: prevotella/strep millieri: R leg gangrene: Clinda (8/13-) ///dc: Aztreonam, Vanco  Endo: ISS  Heme: HSQ  PPX: SCD  Lines: PIV, L TLC (8/13--) for poor IV access /// dc [place]( 8/14-16)   Wounds: RLE dsg, Thigh drains x3 to wall suction.   PT/OT consult requested  transfer to Stepdown Unit

## 2018-08-18 LAB
ANION GAP SERPL CALC-SCNC: 6 MMOL/L — SIGNIFICANT CHANGE UP (ref 5–17)
BUN SERPL-MCNC: 8 MG/DL — SIGNIFICANT CHANGE UP (ref 7–23)
CALCIUM SERPL-MCNC: 8.4 MG/DL — SIGNIFICANT CHANGE UP (ref 8.4–10.5)
CHLORIDE SERPL-SCNC: 98 MMOL/L — SIGNIFICANT CHANGE UP (ref 96–108)
CO2 SERPL-SCNC: 31 MMOL/L — SIGNIFICANT CHANGE UP (ref 22–31)
CREAT SERPL-MCNC: 0.69 MG/DL — SIGNIFICANT CHANGE UP (ref 0.5–1.3)
CULTURE RESULTS: SIGNIFICANT CHANGE UP
GLUCOSE BLDC GLUCOMTR-MCNC: 100 MG/DL — HIGH (ref 70–99)
GLUCOSE BLDC GLUCOMTR-MCNC: 115 MG/DL — HIGH (ref 70–99)
GLUCOSE BLDC GLUCOMTR-MCNC: 127 MG/DL — HIGH (ref 70–99)
GLUCOSE BLDC GLUCOMTR-MCNC: 87 MG/DL — SIGNIFICANT CHANGE UP (ref 70–99)
GLUCOSE BLDC GLUCOMTR-MCNC: 96 MG/DL — SIGNIFICANT CHANGE UP (ref 70–99)
GLUCOSE SERPL-MCNC: 102 MG/DL — HIGH (ref 70–99)
HCT VFR BLD CALC: 29.5 % — LOW (ref 39–50)
HGB BLD-MCNC: 9.7 G/DL — LOW (ref 13–17)
MAGNESIUM SERPL-MCNC: 2.2 MG/DL — SIGNIFICANT CHANGE UP (ref 1.6–2.6)
MCHC RBC-ENTMCNC: 30.3 PG — SIGNIFICANT CHANGE UP (ref 27–34)
MCHC RBC-ENTMCNC: 32.9 G/DL — SIGNIFICANT CHANGE UP (ref 32–36)
MCV RBC AUTO: 92.2 FL — SIGNIFICANT CHANGE UP (ref 80–100)
PHOSPHATE SERPL-MCNC: 3.2 MG/DL — SIGNIFICANT CHANGE UP (ref 2.5–4.5)
PLATELET # BLD AUTO: 497 K/UL — HIGH (ref 150–400)
POTASSIUM SERPL-MCNC: 4.2 MMOL/L — SIGNIFICANT CHANGE UP (ref 3.5–5.3)
POTASSIUM SERPL-SCNC: 4.2 MMOL/L — SIGNIFICANT CHANGE UP (ref 3.5–5.3)
RBC # BLD: 3.2 M/UL — LOW (ref 4.2–5.8)
RBC # FLD: 13.3 % — SIGNIFICANT CHANGE UP (ref 10.3–16.9)
SODIUM SERPL-SCNC: 135 MMOL/L — SIGNIFICANT CHANGE UP (ref 135–145)
SPECIMEN SOURCE: SIGNIFICANT CHANGE UP
WBC # BLD: 10.2 K/UL — SIGNIFICANT CHANGE UP (ref 3.8–10.5)
WBC # FLD AUTO: 10.2 K/UL — SIGNIFICANT CHANGE UP (ref 3.8–10.5)

## 2018-08-18 RX ADMIN — Medication 2 MILLIGRAM(S): at 09:10

## 2018-08-18 RX ADMIN — Medication 1 TABLET(S): at 13:17

## 2018-08-18 RX ADMIN — Medication 100 MILLIGRAM(S): at 13:17

## 2018-08-18 RX ADMIN — Medication 1 MILLIGRAM(S): at 13:17

## 2018-08-18 RX ADMIN — CEFTRIAXONE 100 GRAM(S): 500 INJECTION, POWDER, FOR SOLUTION INTRAMUSCULAR; INTRAVENOUS at 15:03

## 2018-08-18 RX ADMIN — Medication 100 MILLIGRAM(S): at 13:18

## 2018-08-18 RX ADMIN — HYDROMORPHONE HYDROCHLORIDE 1 MILLIGRAM(S): 2 INJECTION INTRAMUSCULAR; INTRAVENOUS; SUBCUTANEOUS at 22:48

## 2018-08-18 RX ADMIN — Medication 100 MILLIGRAM(S): at 22:34

## 2018-08-18 RX ADMIN — HEPARIN SODIUM 5000 UNIT(S): 5000 INJECTION INTRAVENOUS; SUBCUTANEOUS at 13:18

## 2018-08-18 RX ADMIN — Medication 2 MILLIGRAM(S): at 14:53

## 2018-08-18 RX ADMIN — HEPARIN SODIUM 5000 UNIT(S): 5000 INJECTION INTRAVENOUS; SUBCUTANEOUS at 22:33

## 2018-08-18 RX ADMIN — Medication 2 MILLIGRAM(S): at 01:07

## 2018-08-18 RX ADMIN — Medication 100 MILLIGRAM(S): at 06:23

## 2018-08-18 RX ADMIN — HYDROMORPHONE HYDROCHLORIDE 1 MILLIGRAM(S): 2 INJECTION INTRAMUSCULAR; INTRAVENOUS; SUBCUTANEOUS at 15:45

## 2018-08-18 RX ADMIN — PANTOPRAZOLE SODIUM 40 MILLIGRAM(S): 20 TABLET, DELAYED RELEASE ORAL at 13:18

## 2018-08-18 RX ADMIN — Medication 25 MILLIGRAM(S): at 13:17

## 2018-08-18 RX ADMIN — Medication 25 MILLIGRAM(S): at 06:23

## 2018-08-18 RX ADMIN — CHLORHEXIDINE GLUCONATE 1 APPLICATION(S): 213 SOLUTION TOPICAL at 13:18

## 2018-08-18 RX ADMIN — Medication 2 MILLIGRAM(S): at 20:23

## 2018-08-18 RX ADMIN — HEPARIN SODIUM 5000 UNIT(S): 5000 INJECTION INTRAVENOUS; SUBCUTANEOUS at 06:23

## 2018-08-18 RX ADMIN — HYDROMORPHONE HYDROCHLORIDE 1 MILLIGRAM(S): 2 INJECTION INTRAMUSCULAR; INTRAVENOUS; SUBCUTANEOUS at 23:02

## 2018-08-18 RX ADMIN — HYDROMORPHONE HYDROCHLORIDE 1 MILLIGRAM(S): 2 INJECTION INTRAMUSCULAR; INTRAVENOUS; SUBCUTANEOUS at 15:30

## 2018-08-18 RX ADMIN — Medication 25 MILLIGRAM(S): at 22:34

## 2018-08-18 NOTE — PROGRESS NOTE ADULT - SUBJECTIVE AND OBJECTIVE BOX
O/N:CRYSTAL, VSS                                   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 s/p dedridement x2       dilaudid  pain ,   ativan 2q2 prn, haldol q6hrs. psych following  Regular diet,   protonix,   ETOH abuse- Thiamine and folic acid  Ceftriaxone/flagyl   ISS  HSQ  SCD O/N:CRYSTAL, VSS       STATUS POST:  RLE anterior thigh compartment debridement       POST OPERATIVE DAY #: 5    SUBJECTIVE:   No acute events overnight.   Patient denies any new complaints.     Was only able to sit up in bed with PTOh WHITT  ---------------------------------------------------------------    Vital Signs Last 24 Hrs  T(C): 36.5 (18 Aug 2018 09:28), Max: 36.7 (17 Aug 2018 21:00)  T(F): 97.7 (18 Aug 2018 09:28), Max: 98.1 (17 Aug 2018 21:00)  HR: 113 (18 Aug 2018 09:20) (60 - 113)  BP: 113/61 (18 Aug 2018 09:20) (103/59 - 127/61)  BP(mean): 79 (17 Aug 2018 12:00) (72 - 79)  RR: 18 (18 Aug 2018 08:26) (10 - 18)  SpO2: 97% (18 Aug 2018 08:26) (97% - 98%)    I&O's Summary    17 Aug 2018 07:01  -  18 Aug 2018 07:00  --------------------------------------------------------  IN: 0 mL / OUT: 5900 mL / NET: -5900 mL    18 Aug 2018 07:01  -  18 Aug 2018 10:01  --------------------------------------------------------  IN: 0 mL / OUT: 700 mL / NET: -700 mL        ----------------------------------------------------------------    Physical Exam:  General: NAD, Comfortable in bed        Neuro: A&Ox3   Respiratory: nonlabored breathing, no respiratory distress    CV: NSR on monitor, regular rate  Extremities: WWP, nonedematous, sensation intact   RLE: dressing to right thigh wounds.     -------------------------------------------------------------------    LABS:                        9.7    10.2  )-----------( 497      ( 18 Aug 2018 08:14 )             29.5     08-18    135  |  98  |  8   ----------------------------<  102<H>  4.2   |  31  |  0.69    Ca    8.4      18 Aug 2018 08:14  Phos  3.2     08-18  Mg     2.2     08-18              RADIOLOGY & ADDITIONAL STUDIES:
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
## 2018-08-18 NOTE — PHYSICAL THERAPY INITIAL EVALUATION ADULT - SKIN INTEGRITY
anterior and lateral incisions visible due to poorly reinforced ABD dressings anterior and lateral thigh, incision c/d/i at right thigh, 3 ALEKSANDAR sites intact, ace wrap intact right knee to distal LE, kerlix dressing right ankle c/d/i. Patient instructed to cease touching and itching at incision and drain sites multiple times through session with poor return demonstration

## 2018-08-18 NOTE — PHYSICAL THERAPY INITIAL EVALUATION ADULT - PLANNED THERAPY INTERVENTIONS, PT EVAL
bed mobility training/ROM/balance training/transfer training/gait training/strengthening/neuromuscular re-education/postural re-education

## 2018-08-18 NOTE — PHYSICAL THERAPY INITIAL EVALUATION ADULT - ACTIVE RANGE OF MOTION EXAMINATION, REHAB EVAL
RLE knee flexion ~ 35 degrees while dangling at EOB, hip flexion <1/4 range flexion, ankle not tested due to pain, patient able to flex and extend toes through full range./bilateral upper extremity Active ROM was WFL (within functional limits)/Left LE Active ROM was WFL (within functional limits)

## 2018-08-18 NOTE — PHYSICAL THERAPY INITIAL EVALUATION ADULT - GENERAL OBSERVATIONS, REHAB EVAL
Patient received sleeping, easily awoken, (+) PCA (+) EKG (+) ALEKSANDAR x 3 right LE, (+) ace wrapped from knee to distal LE with kerlix dressing right ankle, (+) right IJ central line.

## 2018-08-18 NOTE — PHYSICAL THERAPY INITIAL EVALUATION ADULT - PATIENT/FAMILY/SIGNIFICANT OTHER GOALS STATEMENT, PT EVAL
patient pleasant and agreeable to participate, anxious, requesting ativan prior to participation, RN Sinan premedicated prior to PT

## 2018-08-18 NOTE — PHYSICAL THERAPY INITIAL EVALUATION ADULT - PERTINENT HX OF CURRENT PROBLEM, REHAB EVAL
29 yo undomiciled M PMH including IVDU, paranoid schizophrenia, alcohol abuse, presents to [facility] ED with fever, right thigh IVD injecting site pain and swelling, found to have lice infestation and extensive necrotizing fasciitis right thigh s/p debridement 8/12, 8/13, 8/14 seen for PT eval POD #4.

## 2018-08-19 LAB
GLUCOSE BLDC GLUCOMTR-MCNC: 107 MG/DL — HIGH (ref 70–99)
GLUCOSE BLDC GLUCOMTR-MCNC: 149 MG/DL — HIGH (ref 70–99)

## 2018-08-19 RX ORDER — HYDROMORPHONE HYDROCHLORIDE 2 MG/ML
1 INJECTION INTRAMUSCULAR; INTRAVENOUS; SUBCUTANEOUS ONCE
Qty: 0 | Refills: 0 | Status: DISCONTINUED | OUTPATIENT
Start: 2018-08-19 | End: 2018-08-19

## 2018-08-19 RX ORDER — PERMETHRIN CREAM 5% W/W 50 MG/G
1 CREAM TOPICAL ONCE
Qty: 0 | Refills: 0 | Status: COMPLETED | OUTPATIENT
Start: 2018-08-19 | End: 2018-08-19

## 2018-08-19 RX ADMIN — Medication 100 MILLIGRAM(S): at 05:43

## 2018-08-19 RX ADMIN — Medication 2 MILLIGRAM(S): at 01:54

## 2018-08-19 RX ADMIN — HEPARIN SODIUM 5000 UNIT(S): 5000 INJECTION INTRAVENOUS; SUBCUTANEOUS at 05:43

## 2018-08-19 RX ADMIN — PANTOPRAZOLE SODIUM 40 MILLIGRAM(S): 20 TABLET, DELAYED RELEASE ORAL at 12:57

## 2018-08-19 RX ADMIN — Medication 1 TABLET(S): at 12:57

## 2018-08-19 RX ADMIN — CEFTRIAXONE 100 GRAM(S): 500 INJECTION, POWDER, FOR SOLUTION INTRAMUSCULAR; INTRAVENOUS at 17:28

## 2018-08-19 RX ADMIN — HYDROMORPHONE HYDROCHLORIDE 1 MILLIGRAM(S): 2 INJECTION INTRAMUSCULAR; INTRAVENOUS; SUBCUTANEOUS at 13:25

## 2018-08-19 RX ADMIN — HYDROMORPHONE HYDROCHLORIDE 1 MILLIGRAM(S): 2 INJECTION INTRAMUSCULAR; INTRAVENOUS; SUBCUTANEOUS at 10:44

## 2018-08-19 RX ADMIN — Medication 25 MILLIGRAM(S): at 21:29

## 2018-08-19 RX ADMIN — HEPARIN SODIUM 5000 UNIT(S): 5000 INJECTION INTRAVENOUS; SUBCUTANEOUS at 13:02

## 2018-08-19 RX ADMIN — HYDROMORPHONE HYDROCHLORIDE 1 MILLIGRAM(S): 2 INJECTION INTRAMUSCULAR; INTRAVENOUS; SUBCUTANEOUS at 20:51

## 2018-08-19 RX ADMIN — HYDROMORPHONE HYDROCHLORIDE 1 MILLIGRAM(S): 2 INJECTION INTRAMUSCULAR; INTRAVENOUS; SUBCUTANEOUS at 11:40

## 2018-08-19 RX ADMIN — HYDROMORPHONE HYDROCHLORIDE 1 MILLIGRAM(S): 2 INJECTION INTRAMUSCULAR; INTRAVENOUS; SUBCUTANEOUS at 21:15

## 2018-08-19 RX ADMIN — Medication 100 MILLIGRAM(S): at 21:28

## 2018-08-19 RX ADMIN — HYDROMORPHONE HYDROCHLORIDE 1 MILLIGRAM(S): 2 INJECTION INTRAMUSCULAR; INTRAVENOUS; SUBCUTANEOUS at 06:00

## 2018-08-19 RX ADMIN — HYDROMORPHONE HYDROCHLORIDE 1 MILLIGRAM(S): 2 INJECTION INTRAMUSCULAR; INTRAVENOUS; SUBCUTANEOUS at 12:58

## 2018-08-19 RX ADMIN — Medication 25 MILLIGRAM(S): at 05:44

## 2018-08-19 RX ADMIN — Medication 100 MILLIGRAM(S): at 13:02

## 2018-08-19 RX ADMIN — Medication 1 MILLIGRAM(S): at 12:57

## 2018-08-19 RX ADMIN — Medication 2 MILLIGRAM(S): at 10:53

## 2018-08-19 RX ADMIN — HYDROMORPHONE HYDROCHLORIDE 1 MILLIGRAM(S): 2 INJECTION INTRAMUSCULAR; INTRAVENOUS; SUBCUTANEOUS at 21:01

## 2018-08-19 RX ADMIN — HEPARIN SODIUM 5000 UNIT(S): 5000 INJECTION INTRAVENOUS; SUBCUTANEOUS at 21:29

## 2018-08-19 RX ADMIN — Medication 2 MILLIGRAM(S): at 17:29

## 2018-08-19 RX ADMIN — HYDROMORPHONE HYDROCHLORIDE 30 MILLILITER(S): 2 INJECTION INTRAMUSCULAR; INTRAVENOUS; SUBCUTANEOUS at 01:16

## 2018-08-19 RX ADMIN — Medication 100 MILLIGRAM(S): at 12:57

## 2018-08-19 RX ADMIN — Medication 2 MILLIGRAM(S): at 20:51

## 2018-08-19 RX ADMIN — Medication 25 MILLIGRAM(S): at 13:02

## 2018-08-19 RX ADMIN — HYDROMORPHONE HYDROCHLORIDE 1 MILLIGRAM(S): 2 INJECTION INTRAMUSCULAR; INTRAVENOUS; SUBCUTANEOUS at 17:27

## 2018-08-19 RX ADMIN — HYDROMORPHONE HYDROCHLORIDE 1 MILLIGRAM(S): 2 INJECTION INTRAMUSCULAR; INTRAVENOUS; SUBCUTANEOUS at 05:46

## 2018-08-19 RX ADMIN — Medication 2 MILLIGRAM(S): at 07:07

## 2018-08-19 RX ADMIN — CHLORHEXIDINE GLUCONATE 1 APPLICATION(S): 213 SOLUTION TOPICAL at 12:58

## 2018-08-19 RX ADMIN — HYDROMORPHONE HYDROCHLORIDE 1 MILLIGRAM(S): 2 INJECTION INTRAMUSCULAR; INTRAVENOUS; SUBCUTANEOUS at 14:25

## 2018-08-19 RX ADMIN — PERMETHRIN CREAM 5% W/W 1 APPLICATION(S): 50 CREAM TOPICAL at 17:29

## 2018-08-19 NOTE — PROGRESS NOTE ADULT - SUBJECTIVE AND OBJECTIVE BOX
O/N: CRYSTAL, VSS  8/18 dressings changed, PT seen, did not stand    cefTRIAXone   IVPB   cefTRIAXone   IVPB 2  metroNIDAZOLE  IVPB 500  metroNIDAZOLE  IVPB   cefTRIAXone   IVPB   cefTRIAXone   IVPB 2  heparin  Injectable 5000  metroNIDAZOLE  IVPB 500  metroNIDAZOLE  IVPB       Allergies    penicillin (Unknown)  penicillins (Unknown)    Intolerances        Vital Signs Last 24 Hrs  T(C): 36 (19 Aug 2018 08:32), Max: 36.7 (18 Aug 2018 12:41)  T(F): 96.8 (19 Aug 2018 08:32), Max: 98 (18 Aug 2018 12:41)  HR: 74 (19 Aug 2018 08:32) (73 - 86)  BP: 121/64 (19 Aug 2018 08:32) (102/61 - 121/64)  BP(mean): 75 (19 Aug 2018 02:00) (75 - 75)  RR: 18 (19 Aug 2018 08:32) (15 - 18)  SpO2: 96% (19 Aug 2018 08:32) (96% - 99%)  I&O's Summary    18 Aug 2018 07:01  -  19 Aug 2018 07:00  --------------------------------------------------------  IN: 420 mL / OUT: 2450 mL / NET: -2030 mL        Physical Exam:  General: alert and awake  Pulmonary:  Cardiovascular:  Abdominal:  Extremities: right thigh wound dressing C/D/I  Pulses:       LABS:                        9.7    10.2  )-----------( 497      ( 18 Aug 2018 08:14 )             29.5     08-18    135  |  98  |  8   ----------------------------<  102<H>  4.2   |  31  |  0.69    Ca    8.4      18 Aug 2018 08:14  Phos  3.2     08-18  Mg     2.2     08-18          29 yo M h/o IVDU (meth, heroin) Etoh abuse, schizophrenia p/w RLE pain and erythema 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 Psych following  Regular diet  protonix  ETOH abuse- Thiamine and folic acid  Ceftriaxone/flagyl   ISS  HSQ  SCD O/N: CRYSTAL, VSS  8/18 dressings changed, PT seen, did not stand    cefTRIAXone   IVPB   cefTRIAXone   IVPB 2  metroNIDAZOLE  IVPB 500  metroNIDAZOLE  IVPB   cefTRIAXone   IVPB   cefTRIAXone   IVPB 2  heparin  Injectable 5000  metroNIDAZOLE  IVPB 500  metroNIDAZOLE  IVPB       Allergies    penicillin (Unknown)  penicillins (Unknown)    Intolerances        Vital Signs Last 24 Hrs  T(C): 36 (19 Aug 2018 08:32), Max: 36.7 (18 Aug 2018 12:41)  T(F): 96.8 (19 Aug 2018 08:32), Max: 98 (18 Aug 2018 12:41)  HR: 74 (19 Aug 2018 08:32) (73 - 86)  BP: 121/64 (19 Aug 2018 08:32) (102/61 - 121/64)  BP(mean): 75 (19 Aug 2018 02:00) (75 - 75)  RR: 18 (19 Aug 2018 08:32) (15 - 18)  SpO2: 96% (19 Aug 2018 08:32) (96% - 99%)  I&O's Summary    18 Aug 2018 07:01  -  19 Aug 2018 07:00  --------------------------------------------------------  IN: 420 mL / OUT: 2450 mL / NET: -2030 mL        Physical Exam:  General: alert and awake  Pulmonary:  Cardiovascular:  Abdominal:  Extremities: right thigh wound with pink viable muscle tissue, no drainage, no surrounding erythema/edema, all incisionsC/D/I  Pulses:       LABS:                        9.7    10.2  )-----------( 497      ( 18 Aug 2018 08:14 )             29.5     08-18    135  |  98  |  8   ----------------------------<  102<H>  4.2   |  31  |  0.69    Ca    8.4      18 Aug 2018 08:14  Phos  3.2     08-18  Mg     2.2     08-18          29 yo M h/o IVDU (meth, heroin) Etoh abuse, schizophrenia p/w RLE pain and erythema 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 Psych following  Regular diet  protonix  ETOH abuse- Thiamine and folic acid  Ceftriaxone/flagyl   ISS  HSQ  SCD

## 2018-08-20 LAB
ANION GAP SERPL CALC-SCNC: 11 MMOL/L — SIGNIFICANT CHANGE UP (ref 5–17)
BUN SERPL-MCNC: 14 MG/DL — SIGNIFICANT CHANGE UP (ref 7–23)
CALCIUM SERPL-MCNC: 8.7 MG/DL — SIGNIFICANT CHANGE UP (ref 8.4–10.5)
CHLORIDE SERPL-SCNC: 99 MMOL/L — SIGNIFICANT CHANGE UP (ref 96–108)
CO2 SERPL-SCNC: 28 MMOL/L — SIGNIFICANT CHANGE UP (ref 22–31)
CREAT SERPL-MCNC: 0.68 MG/DL — SIGNIFICANT CHANGE UP (ref 0.5–1.3)
GLUCOSE SERPL-MCNC: 106 MG/DL — HIGH (ref 70–99)
HCT VFR BLD CALC: 32.1 % — LOW (ref 39–50)
HGB BLD-MCNC: 10.3 G/DL — LOW (ref 13–17)
MAGNESIUM SERPL-MCNC: 2.3 MG/DL — SIGNIFICANT CHANGE UP (ref 1.6–2.6)
MCHC RBC-ENTMCNC: 30.4 PG — SIGNIFICANT CHANGE UP (ref 27–34)
MCHC RBC-ENTMCNC: 32.1 G/DL — SIGNIFICANT CHANGE UP (ref 32–36)
MCV RBC AUTO: 94.7 FL — SIGNIFICANT CHANGE UP (ref 80–100)
PHOSPHATE SERPL-MCNC: 3.3 MG/DL — SIGNIFICANT CHANGE UP (ref 2.5–4.5)
PLATELET # BLD AUTO: 559 K/UL — HIGH (ref 150–400)
POTASSIUM SERPL-MCNC: 4.3 MMOL/L — SIGNIFICANT CHANGE UP (ref 3.5–5.3)
POTASSIUM SERPL-SCNC: 4.3 MMOL/L — SIGNIFICANT CHANGE UP (ref 3.5–5.3)
RBC # BLD: 3.39 M/UL — LOW (ref 4.2–5.8)
RBC # FLD: 14.4 % — SIGNIFICANT CHANGE UP (ref 10.3–16.9)
SODIUM SERPL-SCNC: 138 MMOL/L — SIGNIFICANT CHANGE UP (ref 135–145)
SURGICAL PATHOLOGY STUDY: SIGNIFICANT CHANGE UP
WBC # BLD: 12.8 K/UL — HIGH (ref 3.8–10.5)
WBC # FLD AUTO: 12.8 K/UL — HIGH (ref 3.8–10.5)

## 2018-08-20 PROCEDURE — 99233 SBSQ HOSP IP/OBS HIGH 50: CPT

## 2018-08-20 RX ORDER — METOPROLOL TARTRATE 50 MG
2.5 TABLET ORAL ONCE
Qty: 0 | Refills: 0 | Status: COMPLETED | OUTPATIENT
Start: 2018-08-20 | End: 2018-08-20

## 2018-08-20 RX ORDER — ACETAMINOPHEN 500 MG
650 TABLET ORAL EVERY 6 HOURS
Qty: 0 | Refills: 0 | Status: DISCONTINUED | OUTPATIENT
Start: 2018-08-20 | End: 2018-08-22

## 2018-08-20 RX ORDER — KETOROLAC TROMETHAMINE 30 MG/ML
30 SYRINGE (ML) INJECTION EVERY 6 HOURS
Qty: 0 | Refills: 0 | Status: DISCONTINUED | OUTPATIENT
Start: 2018-08-20 | End: 2018-08-22

## 2018-08-20 RX ORDER — HYDROMORPHONE HYDROCHLORIDE 2 MG/ML
1 INJECTION INTRAMUSCULAR; INTRAVENOUS; SUBCUTANEOUS EVERY 4 HOURS
Qty: 0 | Refills: 0 | Status: DISCONTINUED | OUTPATIENT
Start: 2018-08-20 | End: 2018-08-22

## 2018-08-20 RX ORDER — NICOTINE POLACRILEX 2 MG
1 GUM BUCCAL DAILY
Qty: 0 | Refills: 0 | Status: DISCONTINUED | OUTPATIENT
Start: 2018-08-20 | End: 2018-08-22

## 2018-08-20 RX ADMIN — HYDROMORPHONE HYDROCHLORIDE 1 MILLIGRAM(S): 2 INJECTION INTRAMUSCULAR; INTRAVENOUS; SUBCUTANEOUS at 15:17

## 2018-08-20 RX ADMIN — Medication 100 MILLIGRAM(S): at 11:37

## 2018-08-20 RX ADMIN — HYDROMORPHONE HYDROCHLORIDE 1 MILLIGRAM(S): 2 INJECTION INTRAMUSCULAR; INTRAVENOUS; SUBCUTANEOUS at 06:30

## 2018-08-20 RX ADMIN — Medication 650 MILLIGRAM(S): at 18:22

## 2018-08-20 RX ADMIN — PANTOPRAZOLE SODIUM 40 MILLIGRAM(S): 20 TABLET, DELAYED RELEASE ORAL at 11:37

## 2018-08-20 RX ADMIN — HEPARIN SODIUM 5000 UNIT(S): 5000 INJECTION INTRAVENOUS; SUBCUTANEOUS at 14:49

## 2018-08-20 RX ADMIN — HYDROMORPHONE HYDROCHLORIDE 30 MILLILITER(S): 2 INJECTION INTRAMUSCULAR; INTRAVENOUS; SUBCUTANEOUS at 02:36

## 2018-08-20 RX ADMIN — Medication 25 MILLIGRAM(S): at 22:54

## 2018-08-20 RX ADMIN — Medication 1 MILLIGRAM(S): at 11:37

## 2018-08-20 RX ADMIN — CEFTRIAXONE 100 GRAM(S): 500 INJECTION, POWDER, FOR SOLUTION INTRAMUSCULAR; INTRAVENOUS at 15:16

## 2018-08-20 RX ADMIN — Medication 2 MILLIGRAM(S): at 12:18

## 2018-08-20 RX ADMIN — Medication 100 MILLIGRAM(S): at 22:54

## 2018-08-20 RX ADMIN — HYDROMORPHONE HYDROCHLORIDE 1 MILLIGRAM(S): 2 INJECTION INTRAMUSCULAR; INTRAVENOUS; SUBCUTANEOUS at 12:20

## 2018-08-20 RX ADMIN — HYDROMORPHONE HYDROCHLORIDE 1 MILLIGRAM(S): 2 INJECTION INTRAMUSCULAR; INTRAVENOUS; SUBCUTANEOUS at 00:50

## 2018-08-20 RX ADMIN — Medication 2 MILLIGRAM(S): at 00:30

## 2018-08-20 RX ADMIN — Medication 2.5 MILLIGRAM(S): at 13:12

## 2018-08-20 RX ADMIN — HYDROMORPHONE HYDROCHLORIDE 1 MILLIGRAM(S): 2 INJECTION INTRAMUSCULAR; INTRAVENOUS; SUBCUTANEOUS at 06:07

## 2018-08-20 RX ADMIN — HEPARIN SODIUM 5000 UNIT(S): 5000 INJECTION INTRAVENOUS; SUBCUTANEOUS at 06:09

## 2018-08-20 RX ADMIN — Medication 30 MILLIGRAM(S): at 18:22

## 2018-08-20 RX ADMIN — Medication 100 MILLIGRAM(S): at 06:09

## 2018-08-20 RX ADMIN — Medication 100 MILLIGRAM(S): at 14:48

## 2018-08-20 RX ADMIN — Medication 25 MILLIGRAM(S): at 14:49

## 2018-08-20 RX ADMIN — HALOPERIDOL DECANOATE 1 MILLIGRAM(S): 100 INJECTION INTRAMUSCULAR at 22:54

## 2018-08-20 RX ADMIN — Medication 1 PATCH: at 18:22

## 2018-08-20 RX ADMIN — HYDROMORPHONE HYDROCHLORIDE 1 MILLIGRAM(S): 2 INJECTION INTRAMUSCULAR; INTRAVENOUS; SUBCUTANEOUS at 15:31

## 2018-08-20 RX ADMIN — HYDROMORPHONE HYDROCHLORIDE 1 MILLIGRAM(S): 2 INJECTION INTRAMUSCULAR; INTRAVENOUS; SUBCUTANEOUS at 16:12

## 2018-08-20 RX ADMIN — HYDROMORPHONE HYDROCHLORIDE 1 MILLIGRAM(S): 2 INJECTION INTRAMUSCULAR; INTRAVENOUS; SUBCUTANEOUS at 12:04

## 2018-08-20 RX ADMIN — Medication 1 TABLET(S): at 11:37

## 2018-08-20 RX ADMIN — CHLORHEXIDINE GLUCONATE 1 APPLICATION(S): 213 SOLUTION TOPICAL at 08:46

## 2018-08-20 RX ADMIN — Medication 25 MILLIGRAM(S): at 06:06

## 2018-08-20 RX ADMIN — HYDROMORPHONE HYDROCHLORIDE 1 MILLIGRAM(S): 2 INJECTION INTRAMUSCULAR; INTRAVENOUS; SUBCUTANEOUS at 00:30

## 2018-08-20 NOTE — PROGRESS NOTE ADULT - SUBJECTIVE AND OBJECTIVE BOX
24hr Events:  O/N: CRYSTAL, VSS  8/19: dressing changed, whole body wipe with 2% chlorhexidine wipes and permetherin dose given today.        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 psych following  CV: HD stable. Echo ef 55-60% (8/14)  Pulm: RA  GI/FEN: Regular diet, protonix, ETOH abuse- Thiamine and folic acid  : [place]  ID: prevotella/strep millieri: R leg gangrene: Ceftriaxone/flagyl   Endo: ISS  Heme: HSQ  PPX: SCD  Lines: PIV, L TLC (8/13--) /// dc Joana( 8/14-16)   Wounds: RLE dsg, Thigh drains x3 to wall suction.   PT/OT: not ordered 24hr Events:  O/N: CRYSTAL, VSS  8/19: dressing changed, whole body wipe with 2% chlorhexidine wipes and permetherin dose given today.        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 psych following  CV: HD stable. Echo ef 55-60% (8/14)  Pulm: RA  GI/FEN: Regular diet, protonix, ETOH abuse- Thiamine and folic acid  : Voiding  ID: prevotella/strep millieri: R leg gangrene: Ceftriaxone/flagyl   Endo: ISS  Heme: HSQ  PPX: SCD  Lines: PIV, L TLC (8/13--) /// dc Joana( 8/14-16)   Wounds: RLE dsg, Thigh drains x3 to wall suction.   PT/OT: not ordered 24hr Events:  O/N: NANNETTE ROJAS  8/19: dressing changed, whole body wipe with 2% chlorhexidine wipes and permetherin dose given today.      STATUS POST: RLE anterior thigh compartment irrigation and debridement  Take back to OR for addt'l debridement of thigh with addt'l lateral thigh incision and abscess drainage with anterior leg compartment check      POST OPERATIVE DAY #: 7 / 6    SUBJECTIVE:   No acute events overnight. Dressing to be changed today.    ---------------------------------------------------------------    Vital Signs Last 24 Hrs  T(C): 36.3 (20 Aug 2018 06:23), Max: 36.4 (19 Aug 2018 14:53)  T(F): 97.4 (20 Aug 2018 06:23), Max: 97.5 (19 Aug 2018 14:53)  HR: 85 (20 Aug 2018 08:40) (67 - 99)  BP: 109/52 (20 Aug 2018 08:40) (109/52 - 133/54)  BP(mean): --  RR: 16 (20 Aug 2018 08:40) (16 - 20)  SpO2: 99% (20 Aug 2018 08:40) (96% - 100%)    I&O's Summary    19 Aug 2018 07:01  -  20 Aug 2018 07:00  --------------------------------------------------------  IN: 460 mL / OUT: 3375 mL / NET: -2915 mL        ----------------------------------------------------------------    Physical Exam:  General: NAD, Comfortable in bed  Neuro: A&Ox3   Respiratory: nonlabored breathing, no respiratory distress  Right Lower Extremity: Dressing in place with minimal serosanguinous drainage; No significant purulence/malodor to the dressing; Dressing to be changed today      -------------------------------------------------------------------    LABS:                        10.3   12.8  )-----------( 559      ( 20 Aug 2018 07:42 )             32.1     08-20    138  |  99  |  14  ----------------------------<  106<H>  4.3   |  28  |  0.68    Ca    8.7      20 Aug 2018 07:42  Phos  3.3     08-20  Mg     2.3     08-20            RADIOLOGY & ADDITIONAL STUDIES:

## 2018-08-20 NOTE — PROGRESS NOTE ADULT - SUBJECTIVE AND OBJECTIVE BOX
Pain Management Progress Note - [facility] [phone]    HPI: Patient seen at 9:00am. Laying in bed in NAD. Continues to c/o pain.       Pertinent PMH: Pain at: ___Back ___Neck___Knee ___Hip ___Shoulder ___ Opioid tolerance ___x__R LE    Pain is __x_ sharp ____dull _x__burning ___achy ___ Intensity: ____ mild ____mod ___x_severe     Location __x___surgical site _____cervical _____lumbar ____abd _____upper ext___x_lower ext    Worse with __x__activity _x___movement _____physical therapy___ Rest    Improved with __x__medication __x__rest ____physical therapy    sodium chloride 0.9% Bolus  aztreonam  IVPB  vancomycin  IVPB  permethrin 1% Rinse  permethrin 5% Cream  lactated ringers.  clindamycin IVPB  vancomycin  IVPB  aztreonam  IVPB  heparin  Injectable  pantoprazole  Injectable  chlorhexidine 0.12% Liquid  insulin lispro (HumaLOG) corrective regimen sliding scale  propofol Infusion  fentaNYL   Infusion  aztreonam  IVPB  permethrin 1% Rinse  sodium chloride 0.9%.  norepinephrine Infusion  potassium phosphate IVPB  potassium chloride  20 mEq/100 mL IVPB  magnesium sulfate  IVPB  vancomycin  IVPB  midazolam Infusion  sodium chloride 0.9%  permethrin 5% Cream  chlorhexidine 2% Cloths  permethrin 1% Rinse  sodium chloride 0.9% Bolus  propofol Infusion  acetaminophen  IVPB  sodium chloride 0.9% Bolus  thiamine  folic acid Injectable  multivitamin  Drops  thiamine Injectable  multivitamin  propofol Infusion  dexmedetomidine Infusion  LORazepam   Injectable  HYDROmorphone  Injectable  haloperidol    Injectable  acetaminophen   Tablet  potassium chloride   Powder  magnesium sulfate  IVPB  vancomycin  IVPB  folic acid  thiamine  HYDROmorphone PCA (1 mG/mL)  pregabalin  HYDROmorphone  Injectable  cefTRIAXone   IVPB  metroNIDAZOLE  IVPB  cefTRIAXone   IVPB      ROS: Const:  __-_febrile   Eyes:___ENT:___CV: __-_chest pain  Resp: ___-_sob  GI:___nausea ___vomiting ____abd pain ___npo ___clears ___full diet __bm  :___ Musk: __x_pain ___spasm  Skin:___ Neuro:  ___sedation___confusion____ numbness ___weakness ___paresthesia  Psych:___anxiety  Endo:___ Heme:___Allergy:___  __x__ all other systems reviewed and negative     08-20 @ 07:49230 mL/min/1.73M2      Hemoglobin: 10.3 g/dL (08-20 @ 07:42)      T(C): 36.3 (08-20-18 @ 09:56), Max: 36.4 (08-19-18 @ 14:53)  HR: 85 (08-20-18 @ 08:40) (67 - 99)  BP: 109/52 (08-20-18 @ 08:40) (109/52 - 133/54)  RR: 16 (08-20-18 @ 08:40) (16 - 20)  SpO2: 99% (08-20-18 @ 08:40) (96% - 100%)  Wt(kg): --     PHYSICAL EXAM:  Gen Appearance: __x_no acute distress _x__appropriate       Neuro: _x__SILT feet____ EOM Intact Psych: AAOX_3_, _x__mood/affect appropriate        Eyes: ___conjunctiva WNL  _____ Pupils equal and round        ENT: __x_ears and nose atraumatic_x__ Hearing grossly intact        Neck: _x__trachea midline, no visible masses __x_thyroid without palpable mass    Resp: _x__Nml WOB____No tactile fremitus ___clear to auscultation    Cardio: _x__extremities free from edema __x__pedal pulses palpable    GI/Abdomen: __x_soft __x___ Nontender___x___Nondistended_____HSM    Lymphatic: ___no palpable nodes in neck  ___no palpable nodes calves and feet    Skin/Wound: _x__leg wound intact with sutures in place, __x__several ALEKSANDAR drains intact __x_Dressing clean and dry,   __x__surrounding tissues soft,  ___drain/chest tube present____    Muscular: EHL __5_/5  Gastrocnemius_5__/5    __x_absent clubbing/cyanosis __x__SILT    ASSESSMENT:  This is a 28y old Male with a history of:  ANKLE PAIN  Handoff  MEWS Score  Heroin use  Schizophrenia  Alcohol abuse  No pertinent past medical history  IVDA, Heroin use  Necrotizing fasciitis  Polysubstance abuse  Delirium due to another medical condition  Vascular surgery procedure  Vascular surgical procedure involving right lower extremity  No significant past surgical history  ANKLE PAIN  90+        Recommended Treatment PLAN:    1. Recommend consult with addiction expert for substance abuse disorder.  2. When able, stop Dilaudid PCA demand dose.  3. Continue Dilaudid 1mg IV q1 hr prn breakthrough pain , then gradually wean off IV opioids as able over several days.  4. Optimize non-opioid medications such as Tylenol 1000mg q8, and NSAIDs such as Toradol or Motrin.  Plan discussed with Dr. Crow

## 2018-08-20 NOTE — PROVIDER CONTACT NOTE (OTHER) - ACTION/TREATMENT ORDERED:
ZHAO Thomas made aware. Haldol 1 mg IVP is given @ 2254 and Lyrica PO for pain. Will monitor pt closely.

## 2018-08-20 NOTE — PROVIDER CONTACT NOTE (OTHER) - SITUATION
Pt is on PCA pump and requests additional pain medications. BP is 93/62, HR is 88, RR is 16 with 98% on RA. Pt appears agitated. Pt is refusing tele.

## 2018-08-20 NOTE — PROGRESS NOTE BEHAVIORAL HEALTH - PROBLEM SELECTOR PLAN 2
Avoid Ativan prn unless pt is showing signs of benzo withdrawal.   May use Neurontin 300 mg tid prn anxiety which could also be beneficial with pain   Continue motivational interview to assess pt's intent for treatment.

## 2018-08-20 NOTE — PROVIDER CONTACT NOTE (OTHER) - ASSESSMENT
Pt is on PCA pump and requests additional pain medications. BP is 93/62, HR is 88, RR is 16 with 98% on RA. Pt appears agitated.

## 2018-08-20 NOTE — PROGRESS NOTE ADULT - ASSESSMENT
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 psych following  CV: HD stable. Echo ef 55-60% (8/14)  Pulm: Stable on RA  GI/FEN: Regular diet, protonix, ETOH abuse- Thiamine and folic acid supplement  : Voiding without issue  ID: prevotella/strep millieri: R leg gangrene: Ceftriaxone/flagyl- ID consulted for outpatient oral ABx recs   Endo: ISS  Heme: HSQ  PPX: SCD  Lines: PIV, L TLC (8/13--)  Wounds: RLE dsg, Thigh drains x3 to bulb suction.   PT eval

## 2018-08-20 NOTE — CHART NOTE - NSCHARTNOTEFT_GEN_A_CORE
Psychiatry called to assess for capacity to leave AMA. Per the primary team, the patient stated he wanted to leave to smoke a cigarette. He later stated he wanted to go visit his sister in [place].    Upon MD's arrival, the patient was walking to and from his bedroom door, using the IV pole. MD attempted to explain her role.    1. Understanding:  The patient stated he came to the hospital because he was dehydrated and did not drink enough water. He reported he had surgery and "now I'm better. I can walk." He reported now that the "meth is out of my system, my leg is fine." The patient was unable to repeat that he has a bacterial infection in his leg muscle that requires ongoing treatment with antibiotics, despite multiple promptings by MD. He stated "I just need clothing and a wheelchair."    2. Expressing choice:  Patient stated he wanted to go to [place] to visit a girlfriend and get his stuff from her before she left on a trip. He stated "I'm not opposed to medical treatment, I just need to do this." Patient later stated "I just don't want to be here anymore." He was unable to state a clear follow up plan for when he leaves the hospital and obtaining medications.    3. Appreciation:  The patient stated he does not need antibiotics. If he continues to drink water it was cleanse his system. He also stated "I won't do drugs and I'll be fine." Patient stated he does not agree that if he stops taking the antibiotics, there is a highly likelihood he will become septic again.    4. Reasoning:  The patient was unable to state reasoning behind his choices.      Assessment:  The patient does not meet criteria for capacity to leave AMA at this time. He was unable to demonstrate an understanding of his medical illnesses and recommended treatments, despite being informed by this MD. He was unable to state the risks of his decision. He did not demonstrate a clear, consistent choice, as it changed during the interview with this MD and during discussions with his primary team.  He was unable to demonstrate an appreciation of the severity of his condition and he was unable to explain his reasoning.     Separately, although his delirium is reportedly improved based on assessments by his primary team and daytime psychiatry, he may still have residual delirium as he displayed inattention at multiple times throughout the interview. Additionally, he appeared irritable and mildly diaphoretic, concerning for mild benzo and/or opioid withdrawal.    -Would continue to assess patient's pain  -Would monitor for benzodiazepine and opioid withdrawal, which may be contributing to irritability and mild AMS  -May give haldol 5 mg IV q6h prn for agitation  -Avoid Ativan prn unless pt is showing signs of benzo withdrawal.

## 2018-08-21 LAB
ANION GAP SERPL CALC-SCNC: 13 MMOL/L — SIGNIFICANT CHANGE UP (ref 5–17)
BUN SERPL-MCNC: 24 MG/DL — HIGH (ref 7–23)
CALCIUM SERPL-MCNC: 9.2 MG/DL — SIGNIFICANT CHANGE UP (ref 8.4–10.5)
CHLORIDE SERPL-SCNC: 95 MMOL/L — LOW (ref 96–108)
CO2 SERPL-SCNC: 28 MMOL/L — SIGNIFICANT CHANGE UP (ref 22–31)
CREAT SERPL-MCNC: 0.79 MG/DL — SIGNIFICANT CHANGE UP (ref 0.5–1.3)
GLUCOSE SERPL-MCNC: 115 MG/DL — HIGH (ref 70–99)
HCT VFR BLD CALC: 34 % — LOW (ref 39–50)
HGB BLD-MCNC: 10.7 G/DL — LOW (ref 13–17)
MAGNESIUM SERPL-MCNC: 2.1 MG/DL — SIGNIFICANT CHANGE UP (ref 1.6–2.6)
MCHC RBC-ENTMCNC: 30.4 PG — SIGNIFICANT CHANGE UP (ref 27–34)
MCHC RBC-ENTMCNC: 31.5 G/DL — LOW (ref 32–36)
MCV RBC AUTO: 96.6 FL — SIGNIFICANT CHANGE UP (ref 80–100)
PHOSPHATE SERPL-MCNC: 3.9 MG/DL — SIGNIFICANT CHANGE UP (ref 2.5–4.5)
PLATELET # BLD AUTO: 533 K/UL — HIGH (ref 150–400)
POTASSIUM SERPL-MCNC: 4.5 MMOL/L — SIGNIFICANT CHANGE UP (ref 3.5–5.3)
POTASSIUM SERPL-SCNC: 4.5 MMOL/L — SIGNIFICANT CHANGE UP (ref 3.5–5.3)
RBC # BLD: 3.52 M/UL — LOW (ref 4.2–5.8)
RBC # FLD: 15.4 % — SIGNIFICANT CHANGE UP (ref 10.3–16.9)
SODIUM SERPL-SCNC: 136 MMOL/L — SIGNIFICANT CHANGE UP (ref 135–145)
WBC # BLD: 11 K/UL — HIGH (ref 3.8–10.5)
WBC # FLD AUTO: 11 K/UL — HIGH (ref 3.8–10.5)

## 2018-08-21 PROCEDURE — 99233 SBSQ HOSP IP/OBS HIGH 50: CPT

## 2018-08-21 RX ADMIN — Medication 2 MILLIGRAM(S): at 06:45

## 2018-08-21 RX ADMIN — Medication 25 MILLIGRAM(S): at 22:32

## 2018-08-21 RX ADMIN — HYDROMORPHONE HYDROCHLORIDE 1 MILLIGRAM(S): 2 INJECTION INTRAMUSCULAR; INTRAVENOUS; SUBCUTANEOUS at 13:46

## 2018-08-21 RX ADMIN — Medication 100 MILLIGRAM(S): at 13:47

## 2018-08-21 RX ADMIN — Medication 650 MILLIGRAM(S): at 12:43

## 2018-08-21 RX ADMIN — HYDROMORPHONE HYDROCHLORIDE 1 MILLIGRAM(S): 2 INJECTION INTRAMUSCULAR; INTRAVENOUS; SUBCUTANEOUS at 14:01

## 2018-08-21 RX ADMIN — HYDROMORPHONE HYDROCHLORIDE 1 MILLIGRAM(S): 2 INJECTION INTRAMUSCULAR; INTRAVENOUS; SUBCUTANEOUS at 03:18

## 2018-08-21 RX ADMIN — HYDROMORPHONE HYDROCHLORIDE 1 MILLIGRAM(S): 2 INJECTION INTRAMUSCULAR; INTRAVENOUS; SUBCUTANEOUS at 09:20

## 2018-08-21 RX ADMIN — HEPARIN SODIUM 5000 UNIT(S): 5000 INJECTION INTRAVENOUS; SUBCUTANEOUS at 22:31

## 2018-08-21 RX ADMIN — Medication 650 MILLIGRAM(S): at 17:53

## 2018-08-21 RX ADMIN — Medication 1 MILLIGRAM(S): at 11:43

## 2018-08-21 RX ADMIN — HYDROMORPHONE HYDROCHLORIDE 1 MILLIGRAM(S): 2 INJECTION INTRAMUSCULAR; INTRAVENOUS; SUBCUTANEOUS at 18:15

## 2018-08-21 RX ADMIN — HYDROMORPHONE HYDROCHLORIDE 1 MILLIGRAM(S): 2 INJECTION INTRAMUSCULAR; INTRAVENOUS; SUBCUTANEOUS at 09:09

## 2018-08-21 RX ADMIN — Medication 1 TABLET(S): at 11:44

## 2018-08-21 RX ADMIN — HEPARIN SODIUM 5000 UNIT(S): 5000 INJECTION INTRAVENOUS; SUBCUTANEOUS at 13:47

## 2018-08-21 RX ADMIN — HYDROMORPHONE HYDROCHLORIDE 1 MILLIGRAM(S): 2 INJECTION INTRAMUSCULAR; INTRAVENOUS; SUBCUTANEOUS at 03:54

## 2018-08-21 RX ADMIN — CEFTRIAXONE 100 GRAM(S): 500 INJECTION, POWDER, FOR SOLUTION INTRAMUSCULAR; INTRAVENOUS at 15:00

## 2018-08-21 RX ADMIN — Medication 25 MILLIGRAM(S): at 06:45

## 2018-08-21 RX ADMIN — HYDROMORPHONE HYDROCHLORIDE 30 MILLILITER(S): 2 INJECTION INTRAMUSCULAR; INTRAVENOUS; SUBCUTANEOUS at 18:25

## 2018-08-21 RX ADMIN — Medication 650 MILLIGRAM(S): at 18:53

## 2018-08-21 RX ADMIN — Medication 650 MILLIGRAM(S): at 06:49

## 2018-08-21 RX ADMIN — PANTOPRAZOLE SODIUM 40 MILLIGRAM(S): 20 TABLET, DELAYED RELEASE ORAL at 11:44

## 2018-08-21 RX ADMIN — Medication 25 MILLIGRAM(S): at 14:22

## 2018-08-21 RX ADMIN — Medication 650 MILLIGRAM(S): at 07:42

## 2018-08-21 RX ADMIN — Medication 100 MILLIGRAM(S): at 22:32

## 2018-08-21 RX ADMIN — HYDROMORPHONE HYDROCHLORIDE 1 MILLIGRAM(S): 2 INJECTION INTRAMUSCULAR; INTRAVENOUS; SUBCUTANEOUS at 18:00

## 2018-08-21 RX ADMIN — Medication 100 MILLIGRAM(S): at 11:43

## 2018-08-21 RX ADMIN — Medication 650 MILLIGRAM(S): at 11:43

## 2018-08-21 RX ADMIN — Medication 100 MILLIGRAM(S): at 06:45

## 2018-08-21 NOTE — PROGRESS NOTE BEHAVIORAL HEALTH - NSBHFUPINTERVALHXFT_PSY_A_CORE
Pt was able to engage in the interview, c/o pain. He denies hx/o schizophrenia, depression, SI, or past psychiatric treatment. States he has been homeless, in [place] for the past year. Panhandling. Evasive when questioned about substance use. Admits to using Heroin and Xanax daily, does not want to discuss detail. Expresses some motivation for drug rehab after he is medically stable. Pt is estranged from family. Does not have anyone in his life who can help or support him.   Dropped out of 9th grade. Never had stable jobs or relationships.
Pt was able to engage in the interview. He denies hx/o schizophrenia, depression, SI, or past psychiatric treatment. States he has been homeless, in [place] for the past year. Panhandling. Evasive when questioned about substance use. Admits to using Heroin and Xanax daily, does not want to discuss details. Pt with minimal motivation for drug rehab. Pt with limited medical knowledge. Pt however understands that he requires IV ABX and is agreeable to stay in the hospital for now to receive further care. Pt is focused on pain control. Pt states that his father who resides in [place] [phone] agreed for pt to stay with him after discharge. Pt gives permission for team/SW to contact his father.  No evidence of acute mood or psychotic symptoms at this time.
Pt was able to engage in the interview. He denies hx/o schizophrenia, depression, SI, or past psychiatric treatment. States he has been homeless, in [place] for the past year. Panhandling. Evasive when questioned about substance use. Admits to using Heroin and Xanax daily, does not want to discuss detail. Pt with minimal motivation for drug rehab.   No evidence of acute mood or psychotic symptoms at this time.

## 2018-08-21 NOTE — PROGRESS NOTE BEHAVIORAL HEALTH - NSBHFUPINTERVALCCFT_PSY_A_CORE
Pt is alert, able to participate in the interview.
Pt is more alert
Pt is alert, able to participate in the interview.

## 2018-08-21 NOTE — PROGRESS NOTE ADULT - SUBJECTIVE AND OBJECTIVE BOX
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 psych following  CV: HD stable. Echo ef 55-60% (8/14)  Pulm: Stable on RA  GI/FEN: Regular diet, protonix, ETOH abuse- Thiamine and folic acid supplement  : Voiding without issue  ID: prevotella/strep millieri: R leg gangrene: Ceftriaxone/flagyl- ID consulted for outpatient oral ABx recs   Endo: ISS  Heme: HSQ  PPX: SCD  Lines: PIV, L TLC (8/13--)  Wounds: RLE dsg, Thigh drains x3 to bulb suction.   PT eval 24hr Events:  O/N: Psych eval, Pt deemed to be without capacity, Pt convince to stay for few more days, given haldol 1mg x1 for agitation, VSS  8/20 WBC 12, tachycardic to 130, 2.5 mg metoprolol IV, standing toradol IV 30 q6 & tylenol 650 PO Q6H, nicotine patch    cefTRIAXone   IVPB   cefTRIAXone   IVPB 2  metroNIDAZOLE  IVPB 500  metroNIDAZOLE  IVPB   cefTRIAXone   IVPB   cefTRIAXone   IVPB 2  heparin  Injectable 5000  metroNIDAZOLE  IVPB 500  metroNIDAZOLE  IVPB         Vital Signs Last 24 Hrs  T(C): 36.1 (21 Aug 2018 00:00), Max: 36.6 (20 Aug 2018 22:00)  T(F): 96.9 (21 Aug 2018 00:00), Max: 97.9 (20 Aug 2018 22:00)  HR: 75 (21 Aug 2018 03:12) (75 - 130)  BP: 107/53 (21 Aug 2018 03:12) (90/45 - 120/66)  BP(mean): --  RR: 17 (21 Aug 2018 03:12) (16 - 17)  SpO2: 98% (21 Aug 2018 03:12) (98% - 100%)  I&O's Summary    20 Aug 2018 07:01  -  21 Aug 2018 07:00  --------------------------------------------------------  IN: 970 mL / OUT: 400 mL / NET: 570 mL        Physical Exam:  General: NAD, resting comfortably in bed  Pulmonary: Nonlabored breathing, no respiratory distress  Extremity: Dressing in place with minimal serosanguinous drainage; No significant purulence/malodor to the dressing; Dressing to be changed today      LABS:                        10.7   11.0  )-----------( 533      ( 21 Aug 2018 05:47 )             34.0     08-21    136  |  95<L>  |  24<H>  ----------------------------<  115<H>  4.5   |  28  |  0.79    Ca    9.2      21 Aug 2018 05:47  Phos  3.9     08-21  Mg     2.1     08-21        Assessment/Plan;  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 psych following  CV: HD stable. Echo ef 55-60% (8/14)  Pulm: Stable on RA  GI/FEN: Regular diet, protonix, ETOH abuse- Thiamine and folic acid supplement  : Voiding without issue  ID: prevotella/strep millieri: R leg gangrene: Ceftriaxone/flagyl- ID consulted for outpatient oral ABx recs   Endo: ISS  Heme: HSQ  PPX: SCD  Lines: PIV, L TLC (8/13--)  Wounds: RLE dsg  PT recs- AGGIE

## 2018-08-21 NOTE — PROGRESS NOTE BEHAVIORAL HEALTH - RISK ASSESSMENT
chronic risk for self harm given polysubstance abuse

## 2018-08-21 NOTE — PROGRESS NOTE BEHAVIORAL HEALTH - NSBHCONSULTSUBSTANCEOTHER_PSY_A_CORE FT
Motivational interview
Motivational interview. Pt currently refuses referral to substance abuse treatment program.
Motivational interview

## 2018-08-21 NOTE — PROGRESS NOTE BEHAVIORAL HEALTH - PROBLEM SELECTOR PLAN 2
Discontinue prn Ativan prn   May use Neurontin 300 mg tid prn anxiety which could also be beneficial with pain   Continue motivational interview to assess pt's intent for treatment.

## 2018-08-21 NOTE — PROGRESS NOTE BEHAVIORAL HEALTH - PROBLEM SELECTOR PLAN 1
resolving. Pt is alert and oriented X 3 today  Haldol 1 mg IV q 6hrs prn agitation
Resolved
resolving. Pt is alert and oriented X 3 today  Haldol 1 mg IV q 6hrs prn agitation

## 2018-08-21 NOTE — PROGRESS NOTE BEHAVIORAL HEALTH - NSBHADMITCOUNSEL_PSY_A_CORE
instructions for management, treatment and follow up/risk factor reduction/diagnostic results/impressions and/or recommended studies/risks and benefits of treatment options
instructions for management, treatment and follow up/diagnostic results/impressions and/or recommended studies/risks and benefits of treatment options/risk factor reduction
diagnostic results/impressions and/or recommended studies/instructions for management, treatment and follow up/risk factor reduction/risks and benefits of treatment options

## 2018-08-21 NOTE — PROGRESS NOTE BEHAVIORAL HEALTH - NSBHCHARTREVIEWVS_PSY_A_CORE FT
Vital Signs Last 24 Hrs  T(C): 36.1 (21 Aug 2018 14:14), Max: 36.6 (20 Aug 2018 22:00)  T(F): 96.9 (21 Aug 2018 14:14), Max: 97.9 (20 Aug 2018 22:00)  HR: 75 (21 Aug 2018 13:53) (72 - 111)  BP: 945/- (21 Aug 2018 13:53) (90/45 - 945/-)  BP(mean): --  RR: 16 (21 Aug 2018 13:53) (16 - 17)  SpO2: 99% (21 Aug 2018 13:53) (98% - 99%)
Vital Signs Last 24 Hrs  T(C): 36.6 (17 Aug 2018 10:00), Max: 36.8 (16 Aug 2018 17:28)  T(F): 97.8 (17 Aug 2018 10:00), Max: 98.2 (16 Aug 2018 17:28)  HR: 64 (17 Aug 2018 12:00) (52 - 78)  BP: 113/67 (17 Aug 2018 12:00) (90/57 - 137/65)  BP(mean): 79 (17 Aug 2018 12:00) (69 - 100)  RR: 10 (17 Aug 2018 12:00) (10 - 34)  SpO2: 98% (17 Aug 2018 10:00) (94% - 98%)
Vital Signs Last 24 Hrs  T(C): 36.4 (20 Aug 2018 14:13), Max: 36.4 (19 Aug 2018 14:53)  T(F): 97.6 (20 Aug 2018 14:13), Max: 97.6 (20 Aug 2018 14:13)  HR: 130 (20 Aug 2018 12:46) (67 - 130)  BP: 106/53 (20 Aug 2018 12:46) (106/53 - 124/58)  BP(mean): --  RR: 16 (20 Aug 2018 12:46) (16 - 20)  SpO2: 100% (20 Aug 2018 12:46) (96% - 100%)

## 2018-08-21 NOTE — PROGRESS NOTE BEHAVIORAL HEALTH - SUMMARY
Pt is a 29 yo single homeless  male with hx/o polysubstance dependence, no other known past psychiatric history, currently with improved mental status, suggesting resolving delirium. Pt denies past or present depressive or psychotic symptoms. Evasive when questioned about substance use prior to admission, admitting to daily Heroin and Xanax use. Pt does not express interest in drug rehabilitation.
Pt is a 27 yo single homeless  male with hx/o polysubstance dependence, no other known past psychiatric history, currently with improved mental status, suggesting resolving delirium. Pt denies past or present depressive or psychotic symptoms. Evasive when questioned about substance use prior to admission, admitting to daily Heroin and Xanax use. Pt is ambivalent about substance abuse treatment at this time.
Pt is a 29 yo single homeless  male with hx/o polysubstance dependence, no other known past psychiatric history, currently with improved mental status, suggesting resolving delirium. Pt denies past or present depressive or psychotic symptoms. Evasive when questioned about substance use prior to admission, admitting to daily Heroin and Xanax use. Pt does not express interest in drug rehabilitation. He plans to stay with his father in [place] following discharge. At this time pt is agreeable to stay in the hospital for further care.

## 2018-08-22 VITALS
DIASTOLIC BLOOD PRESSURE: 52 MMHG | RESPIRATION RATE: 16 BRPM | OXYGEN SATURATION: 98 % | HEART RATE: 70 BPM | SYSTOLIC BLOOD PRESSURE: 108 MMHG | TEMPERATURE: 96 F

## 2018-08-22 LAB
-  AMOXICILLIN/CLAVULANIC ACID: SIGNIFICANT CHANGE UP
-  CLINDAMYCIN: SIGNIFICANT CHANGE UP
-  COLOSTIN: SIGNIFICANT CHANGE UP
-  COLOSTIN: SIGNIFICANT CHANGE UP
-  METRONIDAZOLE: SIGNIFICANT CHANGE UP
-  MOXIFLOXACIN(AEROBIC): SIGNIFICANT CHANGE UP
CULTURE RESULTS: SIGNIFICANT CHANGE UP
HCT VFR BLD CALC: 33.7 % — LOW (ref 39–50)
HGB BLD-MCNC: 10.6 G/DL — LOW (ref 13–17)
MCHC RBC-ENTMCNC: 30.5 PG — SIGNIFICANT CHANGE UP (ref 27–34)
MCHC RBC-ENTMCNC: 31.5 G/DL — LOW (ref 32–36)
MCV RBC AUTO: 96.8 FL — SIGNIFICANT CHANGE UP (ref 80–100)
METHOD TYPE: SIGNIFICANT CHANGE UP
ORGANISM # SPEC MICROSCOPIC CNT: SIGNIFICANT CHANGE UP
PLATELET # BLD AUTO: 549 K/UL — HIGH (ref 150–400)
RBC # BLD: 3.48 M/UL — LOW (ref 4.2–5.8)
RBC # FLD: 15.9 % — SIGNIFICANT CHANGE UP (ref 10.3–16.9)
WBC # BLD: 10.4 K/UL — SIGNIFICANT CHANGE UP (ref 3.8–10.5)
WBC # FLD AUTO: 10.4 K/UL — SIGNIFICANT CHANGE UP (ref 3.8–10.5)

## 2018-08-22 PROCEDURE — 86900 BLOOD TYPING SEROLOGIC ABO: CPT

## 2018-08-22 PROCEDURE — 83735 ASSAY OF MAGNESIUM: CPT

## 2018-08-22 PROCEDURE — 93005 ELECTROCARDIOGRAM TRACING: CPT

## 2018-08-22 PROCEDURE — 87075 CULTR BACTERIA EXCEPT BLOOD: CPT

## 2018-08-22 PROCEDURE — 85730 THROMBOPLASTIN TIME PARTIAL: CPT

## 2018-08-22 PROCEDURE — 87521 HEPATITIS C PROBE&RVRS TRNSC: CPT

## 2018-08-22 PROCEDURE — 84484 ASSAY OF TROPONIN QUANT: CPT

## 2018-08-22 PROCEDURE — 85025 COMPLETE CBC W/AUTO DIFF WBC: CPT

## 2018-08-22 PROCEDURE — 97110 THERAPEUTIC EXERCISES: CPT

## 2018-08-22 PROCEDURE — 83690 ASSAY OF LIPASE: CPT

## 2018-08-22 PROCEDURE — 94002 VENT MGMT INPAT INIT DAY: CPT

## 2018-08-22 PROCEDURE — 82803 BLOOD GASES ANY COMBINATION: CPT

## 2018-08-22 PROCEDURE — 87184 SC STD DISK METHOD PER PLATE: CPT

## 2018-08-22 PROCEDURE — 86901 BLOOD TYPING SEROLOGIC RH(D): CPT

## 2018-08-22 PROCEDURE — 82550 ASSAY OF CK (CPK): CPT

## 2018-08-22 PROCEDURE — 82330 ASSAY OF CALCIUM: CPT

## 2018-08-22 PROCEDURE — 85610 PROTHROMBIN TIME: CPT

## 2018-08-22 PROCEDURE — 82962 GLUCOSE BLOOD TEST: CPT

## 2018-08-22 PROCEDURE — 87040 BLOOD CULTURE FOR BACTERIA: CPT

## 2018-08-22 PROCEDURE — 80048 BASIC METABOLIC PNL TOTAL CA: CPT

## 2018-08-22 PROCEDURE — 80076 HEPATIC FUNCTION PANEL: CPT

## 2018-08-22 PROCEDURE — 85027 COMPLETE CBC AUTOMATED: CPT

## 2018-08-22 PROCEDURE — 80053 COMPREHEN METABOLIC PANEL: CPT

## 2018-08-22 PROCEDURE — 36415 COLL VENOUS BLD VENIPUNCTURE: CPT

## 2018-08-22 PROCEDURE — 84295 ASSAY OF SERUM SODIUM: CPT

## 2018-08-22 PROCEDURE — 83605 ASSAY OF LACTIC ACID: CPT

## 2018-08-22 PROCEDURE — 97116 GAIT TRAINING THERAPY: CPT

## 2018-08-22 PROCEDURE — 71045 X-RAY EXAM CHEST 1 VIEW: CPT

## 2018-08-22 PROCEDURE — 94003 VENT MGMT INPAT SUBQ DAY: CPT

## 2018-08-22 PROCEDURE — 87389 HIV-1 AG W/HIV-1&-2 AB AG IA: CPT

## 2018-08-22 PROCEDURE — 80202 ASSAY OF VANCOMYCIN: CPT

## 2018-08-22 PROCEDURE — 85018 HEMOGLOBIN: CPT

## 2018-08-22 PROCEDURE — 80074 ACUTE HEPATITIS PANEL: CPT

## 2018-08-22 PROCEDURE — 97162 PT EVAL MOD COMPLEX 30 MIN: CPT

## 2018-08-22 PROCEDURE — 86850 RBC ANTIBODY SCREEN: CPT

## 2018-08-22 PROCEDURE — 80307 DRUG TEST PRSMV CHEM ANLYZR: CPT

## 2018-08-22 PROCEDURE — 86923 COMPATIBILITY TEST ELECTRIC: CPT

## 2018-08-22 PROCEDURE — 96368 THER/DIAG CONCURRENT INF: CPT

## 2018-08-22 PROCEDURE — 99285 EMERGENCY DEPT VISIT HI MDM: CPT | Mod: 25

## 2018-08-22 PROCEDURE — 96366 THER/PROPH/DIAG IV INF ADDON: CPT

## 2018-08-22 PROCEDURE — 84100 ASSAY OF PHOSPHORUS: CPT

## 2018-08-22 PROCEDURE — 84132 ASSAY OF SERUM POTASSIUM: CPT

## 2018-08-22 PROCEDURE — 82553 CREATINE MB FRACTION: CPT

## 2018-08-22 PROCEDURE — 74018 RADEX ABDOMEN 1 VIEW: CPT

## 2018-08-22 PROCEDURE — 93306 TTE W/DOPPLER COMPLETE: CPT

## 2018-08-22 PROCEDURE — 87070 CULTURE OTHR SPECIMN AEROBIC: CPT

## 2018-08-22 PROCEDURE — 88304 TISSUE EXAM BY PATHOLOGIST: CPT

## 2018-08-22 PROCEDURE — 73701 CT LOWER EXTREMITY W/DYE: CPT

## 2018-08-22 PROCEDURE — 96365 THER/PROPH/DIAG IV INF INIT: CPT | Mod: XU

## 2018-08-22 RX ORDER — HYDROMORPHONE HYDROCHLORIDE 2 MG/ML
0.5 INJECTION INTRAMUSCULAR; INTRAVENOUS; SUBCUTANEOUS ONCE
Qty: 0 | Refills: 0 | Status: DISCONTINUED | OUTPATIENT
Start: 2018-08-22 | End: 2018-08-22

## 2018-08-22 RX ORDER — CEFPODOXIME PROXETIL 100 MG
2 TABLET ORAL
Qty: 28 | Refills: 0 | OUTPATIENT
Start: 2018-08-22 | End: 2018-08-28

## 2018-08-22 RX ORDER — METRONIDAZOLE 500 MG
1 TABLET ORAL
Qty: 21 | Refills: 0 | OUTPATIENT
Start: 2018-08-22 | End: 2018-08-28

## 2018-08-22 RX ADMIN — Medication 100 MILLIGRAM(S): at 10:18

## 2018-08-22 RX ADMIN — CHLORHEXIDINE GLUCONATE 1 APPLICATION(S): 213 SOLUTION TOPICAL at 10:19

## 2018-08-22 RX ADMIN — HYDROMORPHONE HYDROCHLORIDE 0.5 MILLIGRAM(S): 2 INJECTION INTRAMUSCULAR; INTRAVENOUS; SUBCUTANEOUS at 07:40

## 2018-08-22 RX ADMIN — HEPARIN SODIUM 5000 UNIT(S): 5000 INJECTION INTRAVENOUS; SUBCUTANEOUS at 06:18

## 2018-08-22 RX ADMIN — Medication 1 PATCH: at 10:19

## 2018-08-22 RX ADMIN — Medication 1 MILLIGRAM(S): at 10:18

## 2018-08-22 RX ADMIN — Medication 100 MILLIGRAM(S): at 06:18

## 2018-08-22 RX ADMIN — Medication 25 MILLIGRAM(S): at 06:18

## 2018-08-22 RX ADMIN — Medication 650 MILLIGRAM(S): at 10:18

## 2018-08-22 RX ADMIN — HYDROMORPHONE HYDROCHLORIDE 0.5 MILLIGRAM(S): 2 INJECTION INTRAMUSCULAR; INTRAVENOUS; SUBCUTANEOUS at 08:00

## 2018-08-22 RX ADMIN — Medication 650 MILLIGRAM(S): at 06:18

## 2018-08-22 RX ADMIN — Medication 650 MILLIGRAM(S): at 07:18

## 2018-08-22 RX ADMIN — Medication 1 TABLET(S): at 10:23

## 2018-08-22 RX ADMIN — PANTOPRAZOLE SODIUM 40 MILLIGRAM(S): 20 TABLET, DELAYED RELEASE ORAL at 10:18

## 2018-08-22 RX ADMIN — Medication 650 MILLIGRAM(S): at 12:50

## 2018-08-22 NOTE — DISCHARGE NOTE ADULT - PLAN OF CARE
Heal left thigh wound. Wound Care: Right thigh closed wounds - clean wound daily with soap and water and pat dry.  No dressing needed. Right thigh open pink wound - clean with soap and water, place saline moistened gauze in wound and cover with dry gauze and ace bandage. Try to keep your pink open wound moist and clean.  [facility] has a wound care clinic on Wednesday and Friday in the morning before 12:00pm. Walk in without appointment. Please attend this clinic for wound care. [address].  Follow up with Dr Louise in 2 weeks. Call office for appointment. Office: [phone].   Call office for fever > 101.5 or drainage or redness of wounds. Your medicaid is pending.  You can go to any [facility] for medication. Wound Care: Right thigh closed wounds - clean wound daily with soap and water and pat dry.  No dressing needed. Right thigh open pink wound - clean with soap and water, place saline moistened gauze in wound and cover with dry gauze and ace bandage. Try to keep your pink open wound moist and clean.  [facility] has a wound care clinic on Wednesday and Friday in the morning before 12:00pm. Walk in without appointment. Please attend this clinic for wound care. [address]  Follow up with Dr Louise in 2 weeks. Call office for appointment. Office: [phone].   Call office for fever > 101.5 or drainage or redness of wounds. Antibiotics: You have been given 1 week of antibiotics. Cefpodoxime and Flagyl. Please take as instructed for 1 week to prevent infection from coming back. Wound Care: Right thigh closed wounds - clean wound daily with soap and water and pat dry.  No dressing needed. Right thigh open pink wound - clean with soap and water, place saline moistened gauze in wound and cover with dry gauze and ace bandage. Try to keep your pink open wound moist and clean.  [facility] has a wound care clinic on Wednesday and Friday in the morning before 12:00pm. Walk in without appointment. Please attend this clinic for wound care. [address].  Follow up with Dr Louise in 2 weeks. Call office for appointment. Office: [phone].   Call office for fever > 101.5 or drainage or redness of wounds.

## 2018-08-22 NOTE — DISCHARGE NOTE ADULT - CARE PLAN
Principal Discharge DX:	Necrotizing fasciitis  Goal:	Heal left thigh wound.  Assessment and plan of treatment:	Wound Care: Right thigh closed wounds - clean wound daily with soap and water and pat dry.  No dressing needed. Right thigh open pink wound - clean with soap and water, place saline moistened gauze in wound and cover with dry gauze and ace bandage. Try to keep your pink open wound moist and clean.  [facility] has a wound care clinic on Wednesday and Friday in the morning before 12:00pm. Walk in without appointment. Please attend this clinic for wound care. [address].  Follow up with Dr Louise in 2 weeks. Call office for appointment. Office: [phone].   Call office for fever > 101.5 or drainage or redness of wounds.  Assessment and plan of treatment:	Your medicaid is pending.  You can go to any [facility] for medication. Principal Discharge DX:	Necrotizing fasciitis  Goal:	Heal left thigh wound.  Assessment and plan of treatment:	Wound Care: Right thigh closed wounds - clean wound daily with soap and water and pat dry.  No dressing needed. Right thigh open pink wound - clean with soap and water, place saline moistened gauze in wound and cover with dry gauze and ace bandage. Try to keep your pink open wound moist and clean.  [facility] has a wound care clinic on Wednesday and Friday in the morning before 12:00pm. Walk in without appointment. Please attend this clinic for wound care. [address].  Follow up with Dr Louise in 2 weeks. Call office for appointment. Office: [phone].   Call office for fever > 101.5 or drainage or redness of wounds.  Assessment and plan of treatment:	Your medicaid is pending.  You can go to any [facility] for medication. Principal Discharge DX:	Necrotizing fasciitis  Goal:	Heal left thigh wound.  Assessment and plan of treatment:	Antibiotics: You have been given 1 week of antibiotics. Cefpodoxime and Flagyl. Please take as instructed for 1 week to prevent infection from coming back. Wound Care: Right thigh closed wounds - clean wound daily with soap and water and pat dry.  No dressing needed. Right thigh open pink wound - clean with soap and water, place saline moistened gauze in wound and cover with dry gauze and ace bandage. Try to keep your pink open wound moist and clean.  [facility] has a wound care clinic on Wednesday and Friday in the morning before 12:00pm. Walk in without appointment. Please attend this clinic for wound care. [address].  Follow up with Dr Louise in 2 weeks. Call office for appointment. Office: [phone].   Call office for fever > 101.5 or drainage or redness of wounds.  Assessment and plan of treatment:	Your medicaid is pending.  You can go to any [facility] for medication.

## 2018-08-22 NOTE — DISCHARGE NOTE ADULT - PATIENT PORTAL LINK FT
You can access the [facility] Patient Portal, offered by [facility], by registering with the following website: [email or web address]

## 2018-08-22 NOTE — PROGRESS NOTE ADULT - SUBJECTIVE AND OBJECTIVE BOX
Pain Management Progress Note - [facility] [phone]    HPI: Patient seen and examined, patient laying down in bed, in no apparent distress. Patient continues to complain of RLE pain, patient expresses some relief with Dilaudid PCA.         Pertinent PMH:   ANKLE PAIN  Handoff  MEWS Score  Heroin use  Schizophrenia  Alcohol abuse  No pertinent past medical history  Heroin use  Necrotizing fasciitis  Polysubstance abuse  Delirium due to another medical condition  Vascular surgery procedure  Vascular surgical procedure involving right lower extremity  No significant past surgical history  ANKLE PAIN      Pain at: ___Back ___Neck___Knee ___Hip __x_ R leg ___Shoulder __x_ Opioid tolerance    Pain is ___ sharp __x__dull ___burning _x__achy ___ Intensity: ____ mild _x___mod __x__severe     Location __x___surgical site _____cervical _____lumbar ____abd _____upper ext_x___R lower ext    Worse with _x___activity _x___movement _x____physical therapy___ Rest    Improved with _x___medication __x__rest ____physical therapy    sodium chloride 0.9% Bolus  aztreonam  IVPB  vancomycin  IVPB  permethrin 1% Rinse  permethrin 5% Cream  lactated ringers.  clindamycin IVPB  heparin  Injectable  pantoprazole  Injectable  chlorhexidine 0.12% Liquid  insulin lispro (HumaLOG) corrective regimen sliding scale  propofol Infusion  fentaNYL   Infusion  aztreonam  IVPB  permethrin 1% Rinse  sodium chloride 0.9%.  norepinephrine Infusion  potassium phosphate IVPB  potassium chloride  20 mEq/100 mL IVPB  magnesium sulfate  IVPB  vancomycin  IVPB  midazolam Infusion  sodium chloride 0.9%  permethrin 5% Cream  chlorhexidine 2% Cloths  permethrin 1% Rinse  sodium chloride 0.9% Bolus  propofol Infusion  acetaminophen  IVPB  sodium chloride 0.9% Bolus  thiamine  folic acid Injectable  multivitamin  Drops  thiamine Injectable  propofol Infusion  dexmedetomidine Infusion  LORazepam   Injectable  HYDROmorphone  Injectable  LORazepam   Injectable  haloperidol    Injectable  acetaminophen   Tablet  potassium chloride   Powder  magnesium sulfate  IVPB  HYDROmorphone  Injectable  HYDROmorphone  Injectable  oxyCODONE    IR  vancomycin  IVPB  folic acid  thiamine  HYDROmorphone PCA (1 mG/mL)  pregabalin  HYDROmorphone  Injectable  HYDROmorphone PCA (1 mG/mL)  cefTRIAXone   IVPB  metroNIDAZOLE  IVPB  cefTRIAXone   IVPB  metroNIDAZOLE  IVPB  LORazepam     Tablet  (ADM OVERRIDE)  HYDROmorphone  Injectable  permethrin 1% Rinse  metoprolol tartrate Injectable  chlordiazePOXIDE  ketorolac   Injectable  acetaminophen   Tablet.  nicotine -   7 mG/24Hr(s) Patch  HYDROmorphone  Injectable  HYDROmorphone  Injectable      ROS: Const:  _-__febrile   Eyes:___ENT:___CV: _-__chest pain  Resp: __-__sob  GI:_-__nausea _-__vomiting _-___abd pain ___npo ___clears _x__full diet __bm  :___ Musk: __x_pain _-__spasm  Skin:___ Neuro:  _-__ihrqopsw__-_govsqwlbv__-__ numbness _-__weakness _-__paresthesia  Psych:__-_anxiety  Endo:___ Heme:___Allergy:___  _x__ All other systems reviewed and negative      Hemoglobin: 10.6 g/dL (08-22 @ 07:23)  Hemoglobin: 10.7 g/dL (08-21 @ 05:47)      T(C): 36.3 (08-22-18 @ 09:24), Max: 36.3 (08-22-18 @ 09:24)  HR: 75 (08-22-18 @ 11:10) (58 - 92)  BP: 110/52 (08-22-18 @ 11:10) (91/51 - 115/53)  RR: 16 (08-22-18 @ 08:50) (16 - 18)  SpO2: 99% (08-22-18 @ 08:50) (98% - 99%)  Wt(kg): --     PHYSICAL EXAM:  Gen Appearance: _x__no acute distress __x_appropriate         Neuro: x___SILT feet____ EOM Intact Psych: AAOX_3_, _x__mood/affect appropriate        Eyes: x___conjunctiva WNL  __x___ Pupils equal and round        ENT: x___ears and nose atraumatic__x_ Hearing grossly intact        Neck: _x__trachea midline, no visible masses ___thyroid without palpable mass    Resp: __x_Nml WOB____No tactile fremitus ___clear to auscultation    Cardio: ___extremities free from edema __x__pedal pulses palpable    GI/Abdomen: __x_soft __x___ Nontender___x___Nondistended_____HSM    Lymphatic: ___no palpable nodes in neck  ___no palpable nodes calves and feet    Skin/Wound: ___Incision, ___Dressing c/d/i,   _x___surrounding tissues soft,  ___drain/chest tube present____    Muscular: EHL _5__/5  Gastrocnemius_5__/5    ___absent clubbing/cyanosis         ASSESSMENT:  This is a 28y old Male with a history of substance abuse presented with RLE pain and gas gangrene.        Recommended Treatment PLAN:  1. D/C PCA  2. Avoid opioids if possible  3. Optimize non opoid medications for pain management  4. If necessary Percocet 5/325mg - 10/325mg PO Q4h prn moderate to severe pain during hospital stay   Plan discussed with Dr. Mignon Lockhart

## 2018-08-22 NOTE — DISCHARGE NOTE ADULT - CARE PROVIDER_API CALL
Remy Louise), Surgery; Vascular Surgery  [address]  [address]  [place], [place] [address]  Phone: [phone]  Fax: [phone]

## 2018-08-22 NOTE — DISCHARGE NOTE ADULT - MEDICATION SUMMARY - MEDICATIONS TO TAKE
I will START or STAY ON the medications listed below when I get home from the hospital:    Flagyl 500 mg oral tablet  -- 1 tab(s) by mouth 3 times a day   -- Do not drink alcoholic beverages when taking this medication.  Finish all this medication unless otherwise directed by prescriber.  May discolor urine or feces.    -- Indication: For Wound Infection    cefpodoxime 200 mg oral tablet  -- 2 tab(s) by mouth every 12 hours   -- Finish all this medication unless otherwise directed by prescriber.  Take with food or milk.    -- Indication: For Wound Infection

## 2018-08-22 NOTE — DISCHARGE NOTE ADULT - HOSPITAL COURSE
Pt is a 27 y/o M with history of IVDU, paranoid schizophrenia, alcohol abuse who presented to [facility] ED with fever c/o right thigh IVD injecting site pain and swelling.  At [facility] pt was hypotensive upon arrival requiring IVF. A CT scan of the thigh was performed which revealed finding consistent with extensive necrotizing infection of the right thigh. Pt was then transferred to [facility] hospital for emergent surgical intervention.  Upon arrival to [facility] Pt is unable to provide any history. He continue to be hypotensive, requiring fluid boluses. C/o of right thigh pain, states its been present for 5 days. Unable to answer any more question.  Pt was resuscitated in SICU. He was taken to OR 8/13 for extensive debridement right thigh. He returned to OR 8/14 for washout and debridement. He tolerated both procedures well. His post op course was unremarkable. He had psych consult.  He refused substance abuse rehab. He was found to have no psychiatric contraindications to discharge. Pt is a 27 y/o M with history of IVDU, paranoid schizophrenia, alcohol abuse who presented to [facility] ED with fever c/o right thigh IVD injecting site pain and swelling.  At [facility] pt was hypotensive upon arrival requiring IVF. A CT scan of the thigh was performed which revealed finding consistent with extensive necrotizing infection of the right thigh. Pt was then transferred to [facility] hospital for emergent surgical intervention.  Upon arrival to [facility] Pt is unable to provide any history. He continue to be hypotensive, requiring fluid boluses. C/o of right thigh pain, states its been present for 5 days. Unable to answer any more question.  Pt was resuscitated in SICU. He was started on broad spectrum Abx.  He was taken to OR 8/13 for extensive debridement right thigh. He returned to OR 8/14 for washout and debridement. He tolerated both procedures well. His post op course was unremarkable. He had psych consult.  He refused substance abuse rehab. He was found to have no psychiatric contraindications to discharge. His right thigh closed wounds were clean and intact. His anterior open thigh wound was healthy and pink. He was taught how to care for his wounds and given supplies. He was given Pt is a 27 y/o M with history of IVDU, paranoid schizophrenia, alcohol abuse who presented to [facility] ED with fever c/o right thigh IVD injecting site pain and swelling.  At [facility] pt was hypotensive upon arrival requiring IVF. A CT scan of the thigh was performed which revealed finding consistent with extensive necrotizing infection of the right thigh. Pt was then transferred to [facility] hospital for emergent surgical intervention.  Upon arrival to [facility] Pt is unable to provide any history. He continue to be hypotensive, requiring fluid boluses. C/o of right thigh pain, states its been present for 5 days. Unable to answer any more question.  Pt was resuscitated in SICU. He was started on broad spectrum Abx.  He was taken to OR 8/13 for extensive debridement right thigh. He returned to OR 8/14 for washout and debridement. He tolerated both procedures well. His post op course was unremarkable. He had psych consult.  He refused substance abuse rehab. He was found to have no psychiatric contraindications to discharge. His right thigh closed wounds were clean and intact. His anterior open thigh wound was healthy and pink. He was taught how to care for his wounds and given supplies. He was given cefpodoxime and flagyl for 1 week.  Physical therapy evaluation found no PT needs.  Pt was discharged in stable condition with information for [facility].

## 2018-08-22 NOTE — PROGRESS NOTE ADULT - SUBJECTIVE AND OBJECTIVE BOX
24hr Events:  O/N: CRYSTAL, VSS,   8/21 psych eval: now has capacity, dressing changed,  Abx Armand rec: Cefpodoxime 400mg BID & flagyl.      Assessment/Plan:  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 psych following  CV: HD stable. Echo ef 55-60% (8/14)  Pulm: Stable on RA  GI/FEN: Regular diet, protonix, ETOH abuse- Thiamine and folic acid supplement  : Voiding without issue  ID: prevotella/strep millieri: R leg gangrene: Ceftriaxone/flagyl- ID consulted for outpatient oral ABx recs   Endo: ISS  Heme: HSQ  PPX: SCD  Lines: PIV, L TLC (8/13--)  Wounds: RLE dsg  PT recs- AGGIE 24hr Events:  O/N: NANNETTE ROJAS,   8/21 psych eval: now has capacity, dressing changed,  Abx [place] rec: Cefpodoxime 400mg BID & flagyl.    S. c/o pain controlled with dilaudid IV.     cefTRIAXone   IVPB   cefTRIAXone   IVPB 2  metroNIDAZOLE  IVPB 500  metroNIDAZOLE  IVPB   cefTRIAXone   IVPB   cefTRIAXone   IVPB 2  heparin  Injectable 5000  metroNIDAZOLE  IVPB 500  metroNIDAZOLE  IVPB       Allergies    penicillin (Unknown)  penicillins (Unknown)    Intolerances        Vital Signs Last 24 Hrs  T(C): 36 (22 Aug 2018 05:59), Max: 36.2 (21 Aug 2018 09:00)  T(F): 96.8 (22 Aug 2018 05:59), Max: 97.2 (21 Aug 2018 09:00)  HR: 58 (22 Aug 2018 06:00) (58 - 89)  BP: 108/55 (22 Aug 2018 06:00) (91/51 - 945/-)  BP(mean): --  RR: 18 (22 Aug 2018 06:00) (16 - 18)  SpO2: 99% (22 Aug 2018 06:00) (98% - 99%)    Physical Exam:  General: awake, alert, NAD  Pulmonary:  Cardiovascular:  Abdominal:  Extremities: Right leg closed wounds on thigh and calf cdi. Open wound anterior thigh pink, granulation tissue. No signs of infection.   Pulses:   Right:                                                                           Left:  FEM [ ]2+ [ ]1+ [ ] doppler                                            FEM [ ]2+ [ ]1+ [ ] doppler    POP [ ]2+ [ ]1+ [ ] doppler                                            POP [ ]2+ [ ]1+ [ ] doppler    DP [ ]2+ [ ]1+ [ ] doppler                                               DP [ ]2+ [ ]1+ [ ] doppler  PT[ ]2+ [ ]1+ [ ] doppler                                                 PT [ ]2+ [ ]1+ [ ] doppler      LABS:                        10.7   11.0  )-----------( 533      ( 21 Aug 2018 05:47 )             34.0     08-21    136  |  95<L>  |  24<H>  ----------------------------<  115<H>  4.5   |  28  |  0.79    Ca    9.2      21 Aug 2018 05:47  Phos  3.9     08-21  Mg     2.1     08-21            RADIOLOGY & ADDITIONAL TESTS:    Assessment/Plan:  29 yo M h/o IVDU (meth, heroin) Etoh abuse, schizophrenia p/w RLE pain and erythema 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  Neuro: dilaudid PCA 0.3 q6 with 1Q4hr prn BT pain , ativan 2q2 prn, haldol q6hrs, toradol 30mg q6hr. psych following  CV: HD stable. Echo ef 55-60% (8/14)  Pulm: Stable on RA  GI/FEN: Regular diet, protonix, ETOH abuse- Thiamine and folic acid supplement  : Voiding without issue  ID: prevotella/strep millieri: R leg gangrene: Ceftriaxone/flagyl-   Abx [place] rec cefpodoxime and flagyl for po ABx.  Endo: ISS  Heme: HSQ  PPX: SCD  Lines: PIV, L TLC (8/13--)  Wounds: RLE dsg  PT recs- AGGIE

## 2018-08-22 NOTE — PROGRESS NOTE ADULT - PROVIDER SPECIALTY LIST ADULT
Pain Medicine
SICU
Vascular Surgery
SICU
SICU

## 2018-08-22 NOTE — CHART NOTE - NSCHARTNOTEFT_GEN_A_CORE
Admitting Diagnosis:   Patient is a 28y old  Male who presents with a chief complaint of Necrotizing fasciitis (13 Aug 2018 01:21)      PAST MEDICAL & SURGICAL HISTORY:  Heroin use  Schizophrenia  Alcohol abuse  No significant past surgical history      Current Nutrition Order: Regular diet    PO Intake: Good (%) [   ]  Fair (50-75%) [   ] Poor (<25%) [   ]    GI Issues:     Pain:    Skin Integrity:    Labs:       136  |  95<L>  |  24<H>  ----------------------------<  115<H>  4.5   |  28  |  0.79    Ca    9.2      21 Aug 2018 05:47  Phos  3.9     -  Mg     2.1     -      CAPILLARY BLOOD GLUCOSE          Medications:  MEDICATIONS  (STANDING):  acetaminophen   Tablet. 650 milliGRAM(s) Oral every 6 hours  cefTRIAXone   IVPB      cefTRIAXone   IVPB 2 Gram(s) IV Intermittent every 24 hours  chlorhexidine 2% Cloths 1 Application(s) Topical daily  folic acid 1 milliGRAM(s) Oral daily  heparin  Injectable 5000 Unit(s) SubCutaneous every 8 hours  HYDROmorphone PCA (1 mG/mL) 30 milliLiter(s) PCA Continuous PCA Continuous  ketorolac   Injectable 30 milliGRAM(s) IV Push every 6 hours  metroNIDAZOLE  IVPB 500 milliGRAM(s) IV Intermittent every 8 hours  metroNIDAZOLE  IVPB      multivitamin 1 Tablet(s) Oral daily  nicotine -   7 mG/24Hr(s) Patch 1 patch Transdermal daily  pantoprazole  Injectable 40 milliGRAM(s) IV Push daily  pregabalin 25 milliGRAM(s) Oral three times a day  thiamine 100 milliGRAM(s) Oral daily    MEDICATIONS  (PRN):  haloperidol    Injectable 1 milliGRAM(s) IV Push every 6 hours PRN Agitation  HYDROmorphone  Injectable 1 milliGRAM(s) IV Push every 4 hours PRN breakthrough pain  LORazepam   Injectable 2 milliGRAM(s) IV Push every 2 hours PRN Agitation      Weight:  63.5 kg ()  Daily     Weight Change:     Estimated energy needs:   Ht 172.72cm; Wt 63.5Kg IBW 70Kg; %IBW 91% BMI 21.3  Utilized IBW to calculate needs 2/2 vent/post-op/pre-op/wound healing  25-30 kcal/k0438-2580 kcal  1.4-1.6 gm/k-112 gm protein  25-30 mL/k5369-7188 mL fluids      Subjective:   29y/o M h/o IVDU (meth, heroin), ETOH abuse, schizophrenia. NKFA. s/p washout and debridement . s/p intubation and extubation, off sedation, off pressors. EN support discontinued. Seen pt at bedside during breakfast meal, more awake, alert and oriented to verbal stimulation, pt tolerating PO diet well with good appetite. Denies N/V/D/C, pain well controlled.     Pt transferred to [facility]    Discharge planning in progress by SW/CM     Previous Nutrition Diagnosis: none identified     If resolved, new PES: none identified     Goal:  Maintain >75% PO intake  No s/s of any GI intolerances  Promote wound healing    Recommendations:  1) Continue regular diet  2) C/w MVI, Folic acid and Thiamine supplementation  C/w MVI, Folic acid, Thiamine supplement     Education:  Encourage PO intake    Risk Level: High [   ] Moderate [   ] Low [   ] Admitting Diagnosis:   Patient is a 28y old  Male who presents with a chief complaint of Necrotizing fasciitis (13 Aug 2018 01:21)      PAST MEDICAL & SURGICAL HISTORY:  Heroin use  Schizophrenia  Alcohol abuse  No significant past surgical history      Current Nutrition Order: Regular diet    PO Intake: Good (%) [ X ]  Fair (50-75%) [   ] Poor (<25%) [   ]    GI Issues: Denies N/V/D/C  +BM      Pain: Denies pain/discomfort     Skin Integrity: intact with surgical incisions    Labs:       136  |  95<L>  |  24<H>  ----------------------------<  115<H>  4.5   |  28  |  0.79    Ca    9.2      21 Aug 2018 05:47  Phos  3.9       Mg     2.1           CAPILLARY BLOOD GLUCOSE          Medications:  MEDICATIONS  (STANDING):  acetaminophen   Tablet. 650 milliGRAM(s) Oral every 6 hours  cefTRIAXone   IVPB      cefTRIAXone   IVPB 2 Gram(s) IV Intermittent every 24 hours  chlorhexidine 2% Cloths 1 Application(s) Topical daily  folic acid 1 milliGRAM(s) Oral daily  heparin  Injectable 5000 Unit(s) SubCutaneous every 8 hours  HYDROmorphone PCA (1 mG/mL) 30 milliLiter(s) PCA Continuous PCA Continuous  ketorolac   Injectable 30 milliGRAM(s) IV Push every 6 hours  metroNIDAZOLE  IVPB 500 milliGRAM(s) IV Intermittent every 8 hours  metroNIDAZOLE  IVPB      multivitamin 1 Tablet(s) Oral daily  nicotine -   7 mG/24Hr(s) Patch 1 patch Transdermal daily  pantoprazole  Injectable 40 milliGRAM(s) IV Push daily  pregabalin 25 milliGRAM(s) Oral three times a day  thiamine 100 milliGRAM(s) Oral daily    MEDICATIONS  (PRN):  haloperidol    Injectable 1 milliGRAM(s) IV Push every 6 hours PRN Agitation  HYDROmorphone  Injectable 1 milliGRAM(s) IV Push every 4 hours PRN breakthrough pain  LORazepam   Injectable 2 milliGRAM(s) IV Push every 2 hours PRN Agitation      Weight:  108 lbs, 107.8 lbs (),  131 lbs (),  122 lbs (),     Weight Change: Weights recorded appears inconsistent. Will continue monitor weights    Estimated energy needs:   Ht 172.72cm; Wt 63.5Kg IBW 70Kg; %IBW 91% BMI 21.3  Utilized IBW to calculate needs 2/2 vent/post-op/pre-op/wound healing  25-30 kcal/k1122-2112 kcal  1.4-1.6 gm/k-112 gm protein  25-30 mL/k7493-4337 mL fluids    Subjective:   29y/o M h/o IVDU (meth, heroin), ETOH abuse, schizophrenia. NKFA. s/p washout and debridement . s/p intubation and extubation, off sedation, off pressors. EN support discontinued. Pt transferred to [facility]. Seen pt at bedside, pt tolerating PO diet >75% well with good appetite. Denies N/V/D/C or pain. Discharge planning in progress by SW/CM     Previous Nutrition Diagnosis: none identified     If resolved, new PES: none identified     Goal:  Maintain >75% PO intake  No s/s of any GI intolerances  Promote wound healing    Recommendations:  1) Continue regular diet  2) C/w MVI, Folic acid and Thiamine supplementation  C/w MVI, Folic acid, Thiamine supplement     Education: Encourage PO intake    Risk Level: High [   ] Moderate [   ] Low [ X ]

## 2018-08-28 DIAGNOSIS — Z78.1 PHYSICAL RESTRAINT STATUS: ICD-10-CM

## 2018-08-28 DIAGNOSIS — F19.10 OTHER PSYCHOACTIVE SUBSTANCE ABUSE, UNCOMPLICATED: ICD-10-CM

## 2018-08-28 DIAGNOSIS — Z88.0 ALLERGY STATUS TO PENICILLIN: ICD-10-CM

## 2018-08-28 DIAGNOSIS — A41.9 SEPSIS, UNSPECIFIED ORGANISM: ICD-10-CM

## 2018-08-28 DIAGNOSIS — J96.90 RESPIRATORY FAILURE, UNSPECIFIED, UNSPECIFIED WHETHER WITH HYPOXIA OR HYPERCAPNIA: ICD-10-CM

## 2018-08-28 DIAGNOSIS — R57.9 SHOCK, UNSPECIFIED: ICD-10-CM

## 2018-08-28 DIAGNOSIS — B19.20 UNSPECIFIED VIRAL HEPATITIS C WITHOUT HEPATIC COMA: ICD-10-CM

## 2018-08-28 DIAGNOSIS — F20.0 PARANOID SCHIZOPHRENIA: ICD-10-CM

## 2018-08-28 DIAGNOSIS — B85.2 PEDICULOSIS, UNSPECIFIED: ICD-10-CM

## 2018-08-28 DIAGNOSIS — M72.6 NECROTIZING FASCIITIS: ICD-10-CM

## 2018-08-28 DIAGNOSIS — E87.2 ACIDOSIS: ICD-10-CM

## 2018-08-28 DIAGNOSIS — F05 DELIRIUM DUE TO KNOWN PHYSIOLOGICAL CONDITION: ICD-10-CM

## 2018-08-28 DIAGNOSIS — F10.10 ALCOHOL ABUSE, UNCOMPLICATED: ICD-10-CM

## 2018-08-28 DIAGNOSIS — A48.0 GAS GANGRENE: ICD-10-CM

## 2020-06-30 NOTE — PROGRESS NOTE BEHAVIORAL HEALTH - ORIENTED TO TIME
Patient due for BMP yesterday.  No labs available in [facility] or [facility] Everywhere.  Called and spoke with Fitz at [facility].  States that the labs were drawn and dropped off at 2:00 pm yesterday.  Will monitor for lab results.  
Yes

## 2020-07-08 NOTE — PATIENT PROFILE ADULT. - AS SC BRADEN MOBILITY
[FreeTextEntry1] : 70 year old man with a history of hypertension and hyperlipidemia.\par \par He is here for a followup visit. \par he has been quarantined at home because of the COVID pandemic. \par He has been still taking care of his wife. He is still his wife's caretaker. \par He has been doing a lot of house work (moving wood) and he is going for about 30 minutes walk a day.  He has been active without any anginal symptoms. \par he has noted more lower extremity edema that is worse since the ambient temperature has risen and improves with elevated. \par He   denies any chest pain, PND, palpitations, orthopnea, near syncope, syncope.  He denies any blurry vision, headaches or recent stroke like symptoms.  \par He is compliant with his medications. \par He has gained weight recently. \par  \par    (3) slightly limited

## 2022-12-01 NOTE — PHYSICAL THERAPY INITIAL EVALUATION ADULT - RISK REDUCTION/PREVENTION, PT EVAL
risk factors Erivedge Counseling- I discussed with the patient the risks of Erivedge including but not limited to nausea, vomiting, diarrhea, constipation, weight loss, changes in the sense of taste, decreased appetite, muscle spasms, and hair loss.  The patient verbalized understanding of the proper use and possible adverse effects of Erivedge.  All of the patient's questions and concerns were addressed.

## 2023-11-21 NOTE — PROCEDURE NOTE - NSPROCSEDATIONNOT_GEN_ALL_CORE
Discharge Planner Post-Acute Rehab PT:      Discharge Plan: Home with spouse (accessible apt). Home care PT.     Precautions: L AKA: NWB LLE, abdominal, wound vac LLE, moderate thick liquids, visual deficits     Current Status:  Bed Mobility: SBA sit>supine, min A through trunk supine>sit. Cues for logroll technique  Transfer: SBA slide board transfer with assist for placement/removal and WC set-up.  Gait: N/T  Stairs: NA  Balance: Fair dynamic sitting. Requires CGA for quasistatic standing in // bars     Assessment: Problem solved bathroom setup w/ spouse, will likely need to perform standpivot with grab bars to use toilet vs. Slideboard to bedside commode. [facility] (see below).      Other Barriers to Discharge (DME, Family Training, etc):   [facility]: [facility] (order faxed on 11/21): commode, tub bench, gait belt, wheelchair, slide board, bedrail.     Family training: TBD    Home measurements sheet provided 11/20.   Yes

## 2024-12-02 NOTE — PROGRESS NOTE ADULT - I WAS PHYSICALLY PRESENT FOR THE KEY PORTIONS OF THE EVALUATION AND MANAGEMENT (E/M) SERVICE PROVIDED.  I AGREE WITH THE ABOVE HISTORY, PHYSICAL, AND PLAN WHICH I HAVE REVIEWED AND EDITED WHERE APPROPRIATE
Patient received Evenity (2 separate injections) injection - confirms use of calcium and vitamin D supplements and denies dental procedures over past 3 months - administered per guidelines. 2 separate Injection given SQ bilateral upper arm. Patient tolerated injection well, without difficulty.    Return appointment provided to patient.    Limited head-to-toe assessment due to privacy issues and visit reason though the opportunity was given for patient to express any concerns.     
Statement Selected